# Patient Record
Sex: MALE | Race: WHITE | Employment: OTHER | URBAN - METROPOLITAN AREA
[De-identification: names, ages, dates, MRNs, and addresses within clinical notes are randomized per-mention and may not be internally consistent; named-entity substitution may affect disease eponyms.]

---

## 2017-11-28 ENCOUNTER — HOSPITAL ENCOUNTER (OUTPATIENT)
Dept: RADIATION THERAPY | Age: 72
Discharge: HOME | End: 2017-11-28

## 2019-08-06 PROBLEM — S83.249A ACUTE MEDIAL MENISCUS TEAR: Status: ACTIVE | Noted: 2019-08-06

## 2019-08-06 PROBLEM — S83.249A ACUTE MEDIAL MENISCUS TEAR: Status: RESOLVED | Noted: 2019-08-06 | Resolved: 2019-08-06

## 2019-08-12 ENCOUNTER — ANESTHESIA EVENT (OUTPATIENT)
Dept: ENDOSCOPY | Age: 74
End: 2019-08-12
Payer: MEDICARE

## 2019-08-13 ENCOUNTER — ANESTHESIA (OUTPATIENT)
Dept: ENDOSCOPY | Age: 74
End: 2019-08-13
Payer: MEDICARE

## 2019-08-13 NOTE — ANESTHESIA PREPROCEDURE EVALUATION
Relevant Problems   No relevant active problems       Anesthetic History   No history of anesthetic complications            Review of Systems / Medical History  Patient summary reviewed, nursing notes reviewed and pertinent labs reviewed    Pulmonary  Within defined limits                 Neuro/Psych   Within defined limits           Cardiovascular    Hypertension              Exercise tolerance: >4 METS     GI/Hepatic/Renal               Comments: Pancreatic mass Endo/Other        Obesity     Other Findings            Physical Exam    Airway  Mallampati: II  TM Distance: > 6 cm  Neck ROM: normal range of motion   Mouth opening: Normal     Cardiovascular  Regular rate and rhythm,  S1 and S2 normal,  no murmur, click, rub, or gallop             Dental    Dentition: Upper partial plate     Pulmonary  Breath sounds clear to auscultation               Abdominal  GI exam deferred       Other Findings            Anesthetic Plan    ASA: 2  Anesthesia type: general and total IV anesthesia          Induction: Intravenous  Anesthetic plan and risks discussed with: Patient

## 2019-08-20 ENCOUNTER — HOSPITAL ENCOUNTER (OUTPATIENT)
Age: 74
Setting detail: OUTPATIENT SURGERY
Discharge: HOME | End: 2019-08-20
Attending: INTERNAL MEDICINE | Admitting: INTERNAL MEDICINE
Payer: MEDICARE

## 2019-08-20 VITALS
DIASTOLIC BLOOD PRESSURE: 81 MMHG | TEMPERATURE: 97.6 F | OXYGEN SATURATION: 97 % | SYSTOLIC BLOOD PRESSURE: 140 MMHG | HEART RATE: 94 BPM | WEIGHT: 220.19 LBS | BODY MASS INDEX: 31.52 KG/M2 | RESPIRATION RATE: 10 BRPM | HEIGHT: 70 IN

## 2019-08-20 PROCEDURE — 88305 TISSUE EXAM BY PATHOLOGIST: CPT

## 2019-08-20 PROCEDURE — 88172 CYTP DX EVAL FNA 1ST EA SITE: CPT

## 2019-08-20 PROCEDURE — 76040000007: Performed by: INTERNAL MEDICINE

## 2019-08-20 PROCEDURE — 74011250636 HC RX REV CODE- 250/636: Performed by: NURSE ANESTHETIST, CERTIFIED REGISTERED

## 2019-08-20 PROCEDURE — 74011250636 HC RX REV CODE- 250/636: Performed by: INTERNAL MEDICINE

## 2019-08-20 PROCEDURE — 77030036673 HC NDL BIOP ENDOSC SHRKCOR PRELD COVD -E: Performed by: INTERNAL MEDICINE

## 2019-08-20 PROCEDURE — 74011000250 HC RX REV CODE- 250: Performed by: NURSE ANESTHETIST, CERTIFIED REGISTERED

## 2019-08-20 PROCEDURE — 88173 CYTOPATH EVAL FNA REPORT: CPT

## 2019-08-20 PROCEDURE — 77030019988 HC FCPS ENDOSC DISP BSC -B: Performed by: INTERNAL MEDICINE

## 2019-08-20 PROCEDURE — 77030019957 HC CUF BLN GASTSCP OCOA -B: Performed by: INTERNAL MEDICINE

## 2019-08-20 PROCEDURE — 77030003406 HC NDL ASPIR BIOP OCOA -C: Performed by: INTERNAL MEDICINE

## 2019-08-20 PROCEDURE — 76060000032 HC ANESTHESIA 0.5 TO 1 HR: Performed by: INTERNAL MEDICINE

## 2019-08-20 RX ORDER — ATROPINE SULFATE 0.1 MG/ML
0.5 INJECTION INTRAVENOUS
Status: DISCONTINUED | OUTPATIENT
Start: 2019-08-20 | End: 2019-08-20 | Stop reason: SDUPTHER

## 2019-08-20 RX ORDER — EPINEPHRINE 0.1 MG/ML
1 INJECTION INTRACARDIAC; INTRAVENOUS
Status: DISCONTINUED | OUTPATIENT
Start: 2019-08-20 | End: 2019-08-20 | Stop reason: SDUPTHER

## 2019-08-20 RX ORDER — PROPOFOL 10 MG/ML
INJECTION, EMULSION INTRAVENOUS
Status: DISCONTINUED | OUTPATIENT
Start: 2019-08-20 | End: 2019-08-20 | Stop reason: HOSPADM

## 2019-08-20 RX ORDER — NALOXONE HYDROCHLORIDE 0.4 MG/ML
0.4 INJECTION, SOLUTION INTRAMUSCULAR; INTRAVENOUS; SUBCUTANEOUS
Status: DISCONTINUED | OUTPATIENT
Start: 2019-08-20 | End: 2019-08-20 | Stop reason: SDUPTHER

## 2019-08-20 RX ORDER — LEVOFLOXACIN 5 MG/ML
500 INJECTION, SOLUTION INTRAVENOUS ONCE
Status: COMPLETED | OUTPATIENT
Start: 2019-08-20 | End: 2019-08-20

## 2019-08-20 RX ORDER — FLUMAZENIL 0.1 MG/ML
0.2 INJECTION INTRAVENOUS
Status: DISCONTINUED | OUTPATIENT
Start: 2019-08-20 | End: 2019-08-20 | Stop reason: HOSPADM

## 2019-08-20 RX ORDER — SODIUM CHLORIDE 0.9 % (FLUSH) 0.9 %
5-40 SYRINGE (ML) INJECTION EVERY 8 HOURS
Status: DISCONTINUED | OUTPATIENT
Start: 2019-08-20 | End: 2019-08-20 | Stop reason: SDUPTHER

## 2019-08-20 RX ORDER — SODIUM CHLORIDE 9 MG/ML
75 INJECTION, SOLUTION INTRAVENOUS CONTINUOUS
Status: DISCONTINUED | OUTPATIENT
Start: 2019-08-20 | End: 2019-08-20 | Stop reason: HOSPADM

## 2019-08-20 RX ORDER — SODIUM CHLORIDE 0.9 % (FLUSH) 0.9 %
5-40 SYRINGE (ML) INJECTION AS NEEDED
Status: DISCONTINUED | OUTPATIENT
Start: 2019-08-20 | End: 2019-08-20 | Stop reason: HOSPADM

## 2019-08-20 RX ORDER — MIDAZOLAM HYDROCHLORIDE 1 MG/ML
1-3 INJECTION, SOLUTION INTRAMUSCULAR; INTRAVENOUS
Status: DISCONTINUED | OUTPATIENT
Start: 2019-08-20 | End: 2019-08-20 | Stop reason: HOSPADM

## 2019-08-20 RX ORDER — EPINEPHRINE 0.1 MG/ML
1 INJECTION INTRACARDIAC; INTRAVENOUS
Status: DISCONTINUED | OUTPATIENT
Start: 2019-08-20 | End: 2019-08-20 | Stop reason: HOSPADM

## 2019-08-20 RX ORDER — ATROPINE SULFATE 0.1 MG/ML
0.5 INJECTION INTRAVENOUS
Status: DISCONTINUED | OUTPATIENT
Start: 2019-08-20 | End: 2019-08-20 | Stop reason: HOSPADM

## 2019-08-20 RX ORDER — SODIUM CHLORIDE 0.9 % (FLUSH) 0.9 %
5-40 SYRINGE (ML) INJECTION EVERY 8 HOURS
Status: DISCONTINUED | OUTPATIENT
Start: 2019-08-20 | End: 2019-08-20 | Stop reason: HOSPADM

## 2019-08-20 RX ORDER — SODIUM CHLORIDE 0.9 % (FLUSH) 0.9 %
5-40 SYRINGE (ML) INJECTION AS NEEDED
Status: DISCONTINUED | OUTPATIENT
Start: 2019-08-20 | End: 2019-08-20 | Stop reason: SDUPTHER

## 2019-08-20 RX ORDER — FLUMAZENIL 0.1 MG/ML
0.2 INJECTION INTRAVENOUS
Status: DISCONTINUED | OUTPATIENT
Start: 2019-08-20 | End: 2019-08-20 | Stop reason: SDUPTHER

## 2019-08-20 RX ORDER — LIDOCAINE HYDROCHLORIDE 20 MG/ML
INJECTION, SOLUTION EPIDURAL; INFILTRATION; INTRACAUDAL; PERINEURAL AS NEEDED
Status: DISCONTINUED | OUTPATIENT
Start: 2019-08-20 | End: 2019-08-20 | Stop reason: HOSPADM

## 2019-08-20 RX ORDER — DEXTROMETHORPHAN/PSEUDOEPHED 2.5-7.5/.8
1.2 DROPS ORAL
Status: DISCONTINUED | OUTPATIENT
Start: 2019-08-20 | End: 2019-08-20 | Stop reason: SDUPTHER

## 2019-08-20 RX ORDER — DEXTROMETHORPHAN/PSEUDOEPHED 2.5-7.5/.8
1.2 DROPS ORAL
Status: DISCONTINUED | OUTPATIENT
Start: 2019-08-20 | End: 2019-08-20 | Stop reason: HOSPADM

## 2019-08-20 RX ORDER — NALOXONE HYDROCHLORIDE 0.4 MG/ML
0.4 INJECTION, SOLUTION INTRAMUSCULAR; INTRAVENOUS; SUBCUTANEOUS
Status: DISCONTINUED | OUTPATIENT
Start: 2019-08-20 | End: 2019-08-20 | Stop reason: HOSPADM

## 2019-08-20 RX ORDER — PROPOFOL 10 MG/ML
INJECTION, EMULSION INTRAVENOUS AS NEEDED
Status: DISCONTINUED | OUTPATIENT
Start: 2019-08-20 | End: 2019-08-20 | Stop reason: HOSPADM

## 2019-08-20 RX ORDER — GLYCOPYRROLATE 0.2 MG/ML
INJECTION INTRAMUSCULAR; INTRAVENOUS AS NEEDED
Status: DISCONTINUED | OUTPATIENT
Start: 2019-08-20 | End: 2019-08-20 | Stop reason: HOSPADM

## 2019-08-20 RX ADMIN — PROPOFOL 30 MG: 10 INJECTION, EMULSION INTRAVENOUS at 12:38

## 2019-08-20 RX ADMIN — PROPOFOL 100 MCG/KG/MIN: 10 INJECTION, EMULSION INTRAVENOUS at 12:40

## 2019-08-20 RX ADMIN — GLYCOPYRROLATE 0.2 MG: 0.2 INJECTION, SOLUTION INTRAMUSCULAR; INTRAVENOUS at 12:35

## 2019-08-20 RX ADMIN — LEVOFLOXACIN 500 MG: 5 INJECTION, SOLUTION INTRAVENOUS at 13:00

## 2019-08-20 RX ADMIN — PROPOFOL 70 MG: 10 INJECTION, EMULSION INTRAVENOUS at 12:35

## 2019-08-20 RX ADMIN — LIDOCAINE HYDROCHLORIDE 40 MG: 20 INJECTION, SOLUTION EPIDURAL; INFILTRATION; INTRACAUDAL; PERINEURAL at 12:35

## 2019-08-20 RX ADMIN — SODIUM CHLORIDE: 900 INJECTION, SOLUTION INTRAVENOUS at 12:30

## 2019-08-20 NOTE — ROUTINE PROCESS
Federico Lott  1945  [id number]    Situation:  Verbal report received from: Tenisha FERRARI  Procedure: Procedure(s):  ENDOSCOPIC ULTRASOUND (EUS)  ESOPHAGOGASTRODUODENOSCOPY (EGD)  ESOPHAGOGASTRODUODENAL (EGD) BIOPSY  FINE NEEDLE ASPIRATION    Background:    Preoperative diagnosis: MASS OF PANCREAS  Postoperative diagnosis: EGD: Gastritis  EUS: MASS OF PANCREAS    :  Dr. Mica Dejesus  Assistant(s): Endoscopy Technician-1: Josephine Crum  Endoscopy RN-1: Kasandra Chaudhary RN    Specimens:   ID Type Source Tests Collected by Time Destination   1 : Bx Preservative Gastric  Irene Ling MD 8/20/2019 1258 Pathology     H. Pylori  no    Assessment:  Intra-procedure medications       Anesthesia gave intra-procedure sedation and medications, see anesthesia flow sheet yes    Intravenous fluids: NS@ KVO     Vital signs stable       Abdominal assessment: round and soft       Recommendation:  Discharge patient per MD order.     Family or Friend  Ronald Andrew Wife  Permission to share finding with family or friend yes

## 2019-08-20 NOTE — ANESTHESIA POSTPROCEDURE EVALUATION
Procedure(s):  ENDOSCOPIC ULTRASOUND (EUS)  ESOPHAGOGASTRODUODENOSCOPY (EGD)  ESOPHAGOGASTRODUODENAL (EGD) BIOPSY  FINE NEEDLE ASPIRATION. general, total IV anesthesia    Anesthesia Post Evaluation        Patient location during evaluation: PACU  Note status: Adequate. Level of consciousness: responsive to verbal stimuli and sleepy but conscious  Pain management: satisfactory to patient  Airway patency: patent  Anesthetic complications: no  Cardiovascular status: acceptable  Respiratory status: acceptable  Hydration status: acceptable  Comments: +Post-Anesthesia Evaluation and Assessment    Patient: Gavin Schmidt MRN: [id number]  SSN: [id number]   YOB: 1945  Age: 75 y.o. Sex: male      Cardiovascular Function/Vital Signs    /80   Pulse (!) 102   Temp 36.5 °C (97.7 °F)   Resp 23   Ht 5' 10\" (1.778 m)   Wt 99.9 kg (220 lb 3 oz)   SpO2 92%   BMI 31.59 kg/m²     Patient is status post Procedure(s):  ENDOSCOPIC ULTRASOUND (EUS)  ESOPHAGOGASTRODUODENOSCOPY (EGD)  ESOPHAGOGASTRODUODENAL (EGD) BIOPSY  FINE NEEDLE ASPIRATION. Nausea/Vomiting: Controlled. Postoperative hydration reviewed and adequate. Pain:  Pain Scale 1: Numeric (0 - 10) (08/20/19 1125)  Pain Intensity 1: 0 (08/20/19 1125)   Managed. Neurological Status: At baseline. Mental Status and Level of Consciousness: Arousable. Pulmonary Status:   O2 Device: Room air (08/20/19 1315)   Adequate oxygenation and airway patent. Complications related to anesthesia: None    Post-anesthesia assessment completed. No concerns.     Signed By: Brandon Haney MD    8/20/2019  Post anesthesia nausea and vomiting:  controlled      Vitals Value Taken Time   /80 8/20/2019  1:15 PM   Temp     Pulse 102 8/20/2019  1:15 PM   Resp 23 8/20/2019  1:15 PM   SpO2 92 % 8/20/2019  1:15 PM

## 2019-08-20 NOTE — H&P
Gastroenterology Outpatient History and Physical    Patient: Kurt Garnica    Physician: Lyle Perez MD    Chief Complaint: Pancreatic mass  History of Present Illness: No complaints    History:  Past Medical History:   Diagnosis Date    Hypertension     History reviewed. No pertinent surgical history. Social History     Socioeconomic History    Marital status:      Spouse name: Not on file    Number of children: Not on file    Years of education: Not on file    Highest education level: Not on file   Tobacco Use    Smoking status: Never Smoker    Smokeless tobacco: Never Used   Substance and Sexual Activity    Alcohol use: Yes     Alcohol/week: 1.0 standard drinks     Types: 1 Cans of beer per week    History reviewed. No pertinent family history. Patient Active Problem List   Diagnosis Code   (none) - all problems resolved or deleted       Allergies: No Known Allergies  Medications:   Prior to Admission medications    Medication Sig Start Date End Date Taking? Authorizing Provider   triamcinolone acetonide (KENALOG) 0.1 % dental paste  7/10/19  Yes Provider, Historical   amLODIPine (NORVASC) 5 mg tablet Take 5 mg by mouth daily. Yes Provider, Historical   losartan (COZAAR) 50 mg tablet Take  by mouth daily. Yes Provider, Historical   sildenafil, antihypertensive, (REVATIO) 20 mg tablet Take 20 mg by mouth three (3) times daily. Yes Provider, Historical   lansoprazole (PREVACID) 30 mg capsule Take  by mouth Daily (before breakfast). Yes Provider, Historical     Physical Exam:   Vital Signs: Blood pressure 141/90, pulse 80, temperature 97.7 °F (36.5 °C), resp. rate 27, height 5' 10\" (1.778 m), weight 99.9 kg (220 lb 3 oz), SpO2 96 %.   General: well developed, well nourished   HEENT: unremarkable   Heart: regular rhythm no mumur    Lungs: clear   Abdominal:  benign   Neurological: unremarkable   Extremities: no edema     Findings/Diagnosis: Pancreatic mass  Plan of Care/Planned Procedure: EUS with conscious/deep sedation    Signed:  Preston Delong MD 8/20/2019

## 2019-08-20 NOTE — PROCEDURES
NAME:  Tonio Quinn   :   1945   MRN:   [id number]     [facility]GUY[facility]N [facility]    Date/Time:  2019   Procedure Type: EGD/EUS    Indications: Pancreatic mass    Pre-operative Diagnosis: see indication above    Post-operative Diagnosis:  See findings below    : Danuta Cummings MD    Referring Provider: --Spencer Agarwal MD    Procedure Details:    Exam:  Airway: clear, no airway problems anticipated  Heart: RRR, without gallops or rubs  Lungs: clear bilaterally without wheezes, crackles, or rhonchi  Abdomen: soft, nontender, nondistended, bowel sounds present  Mental Status: awake, alert and oriented to person, place and time     Anethesia/Sedation:  MAC anesthesia Propofol      Procedure Details   After infom consent was obtained for the procedure, with all risks and benefits of procedure explained the patient was taken to the endoscopy suite and placed in the left lateral decubitus position. Following sequential administration of sedation as per above, the linear echoendoscope was inserted into the mouth and advanced under direct vision to second portion of the duodenum. A careful inspection was made as the gastroscope was withdrawn, including a retroflexed view of the proximal stomach; findings and interventions are described below. Findings:     Endoscopic:  1. Normal proximal, mid, and distal esophagus  2. Mild, segmental, non-erosive antral gastropathy. Biopsied  3. Stomach otherwise normal, including retroflexion  4. Normal duodenal bulb and 2nd portion of the duodenum    Ultrasound:   Pancreas:   A 22 mm by 17 mm oval mass with cystic and solid components was seen in the distal pancreatic body. There was no invasion of  the splenic artery or vein      Specimen Removed:  1. Gastric    Complications: None. EBL:  None. Interventions: Fine needle biopsy was performed of the pancreas  using a 22 gauge and a 25 gauge needle with 4 of passes. Recommendations:   1.  Follow up pathology and cytology    Walt Marin MD

## 2019-08-20 NOTE — PROGRESS NOTES
Anesthesia reports 410mg Propofol, 40mg Lidocaine and 500mL NS given during procedure. Received report from anesthesia staff on vital signs and status of patient.

## 2019-08-20 NOTE — DISCHARGE INSTRUCTIONS
Leticia Duong  [id number]  1945    EUS DISCHARGE INSTRUCTIONS  Discomfort:  Sore throat- throat lozenges or warm salt water gargle  redness at IV site- apply warm compress to area; if redness or soreness persist- contact your physician  Gaseous discomfort- walking, belching will help relieve any discomfort  You may not operate a vehicle for 12 hours  You may not engage in an occupation involving machinery or appliances for rest of today  You may not drink alcoholic beverages for at least 12 hours  Avoid making any critical decisions for at least 24 hour  DIET  You may resume your regular diet - however -  remember your colon is empty and a heavy meal will produce gas. Avoid these foods:  vegetables, fried / greasy foods, carbonated drinks    MEDICATIONS:  Per Medication Reconciliation      ACTIVITY  You may resume your normal daily activities until tomorrow AM;  Spend the remainder of the day resting -  avoid any strenuous activity. CALL M.D. ANY SIGN OF   Increasing pain, nausea, vomiting  Abdominal distension (swelling)  New increased bleeding (oral or rectal)  Fever (chills)  Pain in chest area  Bloody discharge from nose or mouth  Shortness of breath    You may not take any Advil, Aspirin, Ibuprofen, Motrin, Aleve, or Goodys for 10 days, ONLY  Tylenol as needed for pain. IMPRESSION:  Endoscopic:  1. Normal proximal, mid, and distal esophagus  2. Mild, segmental, non-erosive antral gastropathy. Biopsied  3. Stomach otherwise normal, including retroflexion  4. Normal duodenal bulb and 2nd portion of the duodenum    Ultrasound:   Pancreas:   A 22 mm by 17 mm oval mass with cystic and solid components was seen in the distal pancreatic body. There was no invasion of  the splenic artery or vein      Interventions: Fine needle biopsy was performed of the pancreas  using a 22 gauge and a 25 gauge needle with 4 of passes. Recommendations:   1.  Follow up pathology and cytology    Follow-up Instructions:   Call Dr. Taryn Cohen for the results of procedure / biopsy in 7-10 days   Telephone # [phone]    Paul Ocasio MD

## 2020-10-13 ENCOUNTER — HOSPITAL ENCOUNTER (OUTPATIENT)
Dept: PREADMISSION TESTING | Age: 75
Discharge: HOME | End: 2020-10-13
Payer: MEDICARE

## 2020-10-13 ENCOUNTER — TRANSCRIBE ORDER (OUTPATIENT)
Dept: REGISTRATION | Age: 75
End: 2020-10-13

## 2020-10-13 VITALS
BODY MASS INDEX: 31.97 KG/M2 | RESPIRATION RATE: 17 BRPM | WEIGHT: 223.33 LBS | TEMPERATURE: 97.8 F | HEIGHT: 70 IN | SYSTOLIC BLOOD PRESSURE: 134 MMHG | DIASTOLIC BLOOD PRESSURE: 78 MMHG | HEART RATE: 72 BPM

## 2020-10-13 DIAGNOSIS — Z01.812 PRE-PROCEDURE LAB EXAM: Primary | ICD-10-CM

## 2020-10-13 LAB
ABO + RH BLD: NORMAL
ANION GAP SERPL CALC-SCNC: 6 MMOL/L (ref 5–15)
APPEARANCE UR: CLEAR
BACTERIA URNS QL MICRO: NEGATIVE /HPF
BILIRUB UR QL: NEGATIVE
BLOOD GROUP ANTIBODIES SERPL: NORMAL
BUN SERPL-MCNC: 10 MG/DL (ref 6–20)
BUN/CREAT SERPL: 14 (ref 12–20)
CALCIUM SERPL-MCNC: 8.9 MG/DL (ref 8.5–10.1)
CHLORIDE SERPL-SCNC: 108 MMOL/L (ref 97–108)
CO2 SERPL-SCNC: 26 MMOL/L (ref 21–32)
COLOR UR: NORMAL
CREAT SERPL-MCNC: 0.7 MG/DL (ref 0.7–1.3)
EPITH CASTS URNS QL MICRO: NORMAL /LPF
ERYTHROCYTE [DISTWIDTH] IN BLOOD BY AUTOMATED COUNT: 12.8 % (ref 11.5–14.5)
EST. AVERAGE GLUCOSE BLD GHB EST-MCNC: 117 MG/DL
GLUCOSE SERPL-MCNC: 106 MG/DL (ref 65–100)
GLUCOSE UR STRIP.AUTO-MCNC: NEGATIVE MG/DL
HBA1C MFR BLD: 5.7 % (ref 4–5.6)
HCT VFR BLD AUTO: 44 % (ref 36.6–50.3)
HGB BLD-MCNC: 14.7 G/DL (ref 12.1–17)
HGB UR QL STRIP: NEGATIVE
HYALINE CASTS URNS QL MICRO: NORMAL /LPF (ref 0–5)
INR PPP: 1 (ref 0.9–1.1)
KETONES UR QL STRIP.AUTO: NEGATIVE MG/DL
LEUKOCYTE ESTERASE UR QL STRIP.AUTO: NEGATIVE
MCH RBC QN AUTO: 31.9 PG (ref 26–34)
MCHC RBC AUTO-ENTMCNC: 33.4 G/DL (ref 30–36.5)
MCV RBC AUTO: 95.4 FL (ref 80–99)
NITRITE UR QL STRIP.AUTO: NEGATIVE
NRBC # BLD: 0 K/UL (ref 0–0.01)
NRBC BLD-RTO: 0 PER 100 WBC
PH UR STRIP: 7 [PH] (ref 5–8)
PLATELET # BLD AUTO: 233 K/UL (ref 150–400)
PMV BLD AUTO: 10.5 FL (ref 8.9–12.9)
POTASSIUM SERPL-SCNC: 4.1 MMOL/L (ref 3.5–5.1)
PROT UR STRIP-MCNC: NEGATIVE MG/DL
PROTHROMBIN TIME: 10.7 SEC (ref 9–11.1)
RBC # BLD AUTO: 4.61 M/UL (ref 4.1–5.7)
RBC #/AREA URNS HPF: NORMAL /HPF (ref 0–5)
SODIUM SERPL-SCNC: 140 MMOL/L (ref 136–145)
SP GR UR REFRACTOMETRY: 1.01 (ref 1–1.03)
SPECIMEN EXP DATE BLD: NORMAL
UA: UC IF INDICATED,UAUC: NORMAL
UROBILINOGEN UR QL STRIP.AUTO: 0.2 EU/DL (ref 0.2–1)
WBC # BLD AUTO: 6.3 K/UL (ref 4.1–11.1)
WBC URNS QL MICRO: NORMAL /HPF (ref 0–4)

## 2020-10-13 PROCEDURE — 83036 HEMOGLOBIN GLYCOSYLATED A1C: CPT

## 2020-10-13 PROCEDURE — 86900 BLOOD TYPING SEROLOGIC ABO: CPT

## 2020-10-13 PROCEDURE — 81001 URINALYSIS AUTO W/SCOPE: CPT

## 2020-10-13 PROCEDURE — 85027 COMPLETE CBC AUTOMATED: CPT

## 2020-10-13 PROCEDURE — 36415 COLL VENOUS BLD VENIPUNCTURE: CPT

## 2020-10-13 PROCEDURE — 93005 ELECTROCARDIOGRAM TRACING: CPT

## 2020-10-13 PROCEDURE — 85610 PROTHROMBIN TIME: CPT

## 2020-10-13 PROCEDURE — 80048 BASIC METABOLIC PNL TOTAL CA: CPT

## 2020-10-13 RX ORDER — IBUPROFEN 200 MG
600 TABLET ORAL
COMMUNITY
End: 2020-10-23

## 2020-10-13 NOTE — PERIOP NOTES
PAT Nurse Practitioner   Pre-Operative Chart Review/Assessment:-ORTHOPEDIC                Patient Name:  Nuvia Lomas                                                           Age:   75 y.o.    :  1945     Today's Date:  10/14/2020     Date of PAT:   10/13/2020      Date of Surgery:    10/22/2020      Procedure(s):  Left Total vs. Medial Unicondylar Knee Arthroplasty     Surgeon:   Dr. Jerry Menchaca                       PLAN:      1)  Medical Clearance:  Dr. Tiffany Mancera      2)  Cardiac Clearance:  Not requested. 3)  Diabetic Treatment Consult:  Not indicated. A1c-5.7      4)  Sleep Apnea evaluation:   CHAPARRITA score of 4. Pt reports occasional loud snoring that can be heard through a closed door. Pt denies daytime fatigue and witnessed pauses in breathing. Referral sent to PCP for F/U and recommendations. 5) Treatment for MRSA/Staph Aureus:  +MRSA. Tx w/ mupirocin. 6) Additional Concerns:  Former smoker, HTN, GERD, [place]              Vital Signs:         Vitals:    10/13/20 0929   BP: 134/78   Pulse: 72   Resp: 17   Temp: 97.8 °F (36.6 °C)   Weight: 101.3 kg (223 lb 5.2 oz)   Height: 5' 10\" (1.778 m)            ____________________________________________  PAST MEDICAL HISTORY  Past Medical History:   Diagnosis Date    Arthritis     Cancer ([address] 10/2018    PROSTATE    Chronic pain     GERD (gastroesophageal reflux disease)     Hypertension     Kidney stones       ____________________________________________  PAST SURGICAL HISTORY  Past Surgical History:   Procedure Laterality Date    HX KNEE ARTHROSCOPY Left     HX PROSTATE SURGERY      PROSTATE SEEDS IMPLANTED       ____________________________________________  HOME MEDICATIONS  Current Outpatient Medications   Medication Sig    mupirocin (BACTROBAN) 2 % ointment by Both Nostrils route two (2) times a day for 7 days.  mupirocin (BACTROBAN) 2 % ointment by Both Nostrils route two (2) times a day.  Indications: methicillin-resistant Staphylococcus aureus bacteria in nose    ibuprofen (AdviL) 200 mg tablet Take 600 mg by mouth every twelve (12) hours as needed for Pain.  triamcinolone acetonide (KENALOG) 0.1 % dental paste     amLODIPine (NORVASC) 5 mg tablet Take 5 mg by mouth daily.  losartan (COZAAR) 50 mg tablet Take  by mouth daily.  sildenafil, antihypertensive, (REVATIO) 20 mg tablet Take 20 mg by mouth three (3) times daily.  lansoprazole (PREVACID) 30 mg capsule Take  by mouth Daily (before breakfast). No current facility-administered medications for this encounter.       ____________________________________________  ALLERGIES  No Known Allergies   ____________________________________________  SOCIAL HISTORY  Social History     Tobacco Use    Smoking status: Former Smoker     Packs/day: 0.25     Last attempt to quit: 1970     Years since quittin.8    Smokeless tobacco: Never Used   Substance Use Topics    Alcohol use:  Yes     Alcohol/week: 2.0 standard drinks     Types: 2 Cans of beer per week      ____________________________________________        Labs:     Hospital Outpatient Visit on 10/13/2020   Component Date Value Ref Range Status    Sodium 10/13/2020 140  136 - 145 mmol/L Final    Potassium 10/13/2020 4.1  3.5 - 5.1 mmol/L Final    Chloride 10/13/2020 108  97 - 108 mmol/L Final    CO2 10/13/2020 26  21 - 32 mmol/L Final    Anion gap 10/13/2020 6  5 - 15 mmol/L Final    Glucose 10/13/2020 106* 65 - 100 mg/dL Final    BUN 10/13/2020 10  6 - 20 MG/DL Final    Creatinine 10/13/2020 0.70  0.70 - 1.30 MG/DL Final    BUN/Creatinine ratio 10/13/2020 14  12 - 20   Final    GFR est AA 10/13/2020 >60  >60 ml/min/1.73m2 Final    GFR est non-AA 10/13/2020 >60  >60 ml/min/1.73m2 Final    Estimated GFR is calculated using the IDMS-traceable Modification of Diet in Renal Disease (MDRD) Study equation, reported for both  Americans (GFRAA) and non- Americans (GFRNA), and normalized to 1.73m2 body surface area. The physician must decide which value applies to the patient.  Calcium 10/13/2020 8.9  8.5 - 10.1 MG/DL Final    WBC 10/13/2020 6.3  4.1 - 11.1 K/uL Final    RBC 10/13/2020 4.61  4.10 - 5.70 M/uL Final    HGB 10/13/2020 14.7  12.1 - 17.0 g/dL Final    HCT 10/13/2020 44.0  36.6 - 50.3 % Final    MCV 10/13/2020 95.4  80.0 - 99.0 FL Final    MCH 10/13/2020 31.9  26.0 - 34.0 PG Final    MCHC 10/13/2020 33.4  30.0 - 36.5 g/dL Final    RDW 10/13/2020 12.8  11.5 - 14.5 % Final    PLATELET 34/09/4189 513  150 - 400 K/uL Final    MPV 10/13/2020 10.5  8.9 - 12.9 FL Final    NRBC 10/13/2020 0.0  0  WBC Final    ABSOLUTE NRBC 10/13/2020 0.00  0.00 - 0.01 K/uL Final    Crossmatch Expiration 10/13/2020 10/25/2020   Final    ABO/Rh(D) 10/13/2020 O POSITIVE   Final    Antibody screen 10/13/2020 NEG   Final    INR 10/13/2020 1.0  0.9 - 1.1   Final    A single therapeutic range for Vit K antagonists may not be optimal for all indications - see June, 2008 issue of Chest, American College of Chest Physicians Evidence-Based Clinical Practice Guidelines, 8th Edition.     Prothrombin time 10/13/2020 10.7  9.0 - 11.1 sec Final    Color 10/13/2020 YELLOW/STRAW    Final    Color Reference Range: Straw, Yellow or Dark Yellow    Appearance 10/13/2020 CLEAR  CLEAR   Final    Specific gravity 10/13/2020 1.010  1.003 - 1.030   Final    pH (UA) 10/13/2020 7.0  5.0 - 8.0   Final    Protein 10/13/2020 Negative  NEG mg/dL Final    Glucose 10/13/2020 Negative  NEG mg/dL Final    Ketone 10/13/2020 Negative  NEG mg/dL Final    Bilirubin 10/13/2020 Negative  NEG   Final    Blood 10/13/2020 Negative  NEG   Final    Urobilinogen 10/13/2020 0.2  0.2 - 1.0 EU/dL Final    Nitrites 10/13/2020 Negative  NEG   Final    Leukocyte Esterase 10/13/2020 Negative  NEG   Final    UA:UC IF INDICATED 10/13/2020 CULTURE NOT INDICATED BY UA RESULT    Final    WBC 10/13/2020 0-4  0 - 4 /hpf Final    RBC 10/13/2020 0-5  0 - 5 /hpf Final    Epithelial cells 10/13/2020 FEW  FEW /lpf Final    Epithelial cell category consists of squamous cells and /or transitional urothelial cells. Renal tubular cells, if present, are separately identified as such.  Bacteria 10/13/2020 Negative  NEG /hpf Final    Hyaline cast 10/13/2020 0-2  0 - 5 /lpf Final    Ventricular Rate 10/13/2020 70  BPM Final    Atrial Rate 10/13/2020 70  BPM Final    P-R Interval 10/13/2020 142  ms Final    QRS Duration 10/13/2020 82  ms Final    Q-T Interval 10/13/2020 396  ms Final    QTC Calculation (Bezet) 10/13/2020 427  ms Final    Calculated P Axis 10/13/2020 10  degrees Final    Calculated R Axis 10/13/2020 -18  degrees Final    Calculated T Axis 10/13/2020 17  degrees Final    Diagnosis 10/13/2020    Final                    Value:Normal sinus rhythm  Normal ECG  No previous ECGs available  Confirmed by Alley Easton M.D., Doc Denson ([id number]) on 10/14/2020 10:16:09 AM      Hemoglobin A1c 10/13/2020 5.7* 4.0 - 5.6 % Final    Comment: NEW METHOD  PLEASE NOTE NEW REFERENCE RANGE  (NOTE)  HbA1C Interpretive Ranges  <5.7              Normal  5.7 - 6.4         Consider Prediabetes  >6.5              Consider Diabetes      Est. average glucose 10/13/2020 117  mg/dL Final    Special Requests: 10/13/2020 NO SPECIAL REQUESTS    Final    Culture result: 10/13/2020 MRSA PRESENT*   Final       Skin:     Denies open wounds, cuts, sores, rashes or other areas of concern in PAT assessment.           Allie Martinez NP

## 2020-10-13 NOTE — PERIOP NOTES
DO NOT EAT ANYTHING AFTER MIDNIGHT, except as instructed THE NIGHT BEFORE SURGERY. PT GIVEN OPPORTUNITY TO ASK ADDITIONAL QUESTIONS. PRE-OPERATIVE INSTRUCTIONS REVIEWED WITH PATIENT. PATIENT GIVEN 2-PACK OF CHG SOAP. INSTRUCTIONS (REVIEWED) ON USE OF CHG SOAP. PATIENT GIVEN [facility]SI INFECTION FAQ SHEET. MRSA / MSSA TREATMENT INSTRUCTION SHEET GIVEN WITH AN EXPLANATION TO PATIENT THAT THEY WILL BE NOTIFIED IF TREATMENT INSTRUCTIONS NEED TO BE INITIATED. PATIENT WAS GIVEN THE OPPORTUNITY TO ASK QUESTIONS ON THE INFORMATION PROVIDED. [facility] APPOINTMENTS REVIEWED AND GIVEN TO PATIENT, INCLUDING PHONE NUMBER TO REGISTRATION. COVID-19 INSTRUCTION SHEET ALSO REVIEWED AND GIVEN TO PATIENT.

## 2020-10-14 LAB
ATRIAL RATE: 70 BPM
BACTERIA SPEC CULT: ABNORMAL
BACTERIA SPEC CULT: ABNORMAL
CALCULATED P AXIS, ECG09: 10 DEGREES
CALCULATED R AXIS, ECG10: -18 DEGREES
CALCULATED T AXIS, ECG11: 17 DEGREES
DIAGNOSIS, 93000: NORMAL
P-R INTERVAL, ECG05: 142 MS
Q-T INTERVAL, ECG07: 396 MS
QRS DURATION, ECG06: 82 MS
QTC CALCULATION (BEZET), ECG08: 427 MS
SERVICE CMNT-IMP: ABNORMAL
VENTRICULAR RATE, ECG03: 70 BPM

## 2020-10-14 RX ORDER — MUPIROCIN 20 MG/G
OINTMENT TOPICAL 2 TIMES DAILY
Qty: 22 G | Refills: 0 | Status: SHIPPED | OUTPATIENT
Start: 2020-10-14 | End: 2020-10-23

## 2020-10-14 RX ORDER — VANCOMYCIN/0.9 % SOD CHLORIDE 1.5G/250ML
1500 PLASTIC BAG, INJECTION (ML) INTRAVENOUS ONCE
Status: CANCELLED | OUTPATIENT
Start: 2020-10-22 | End: 2020-10-22

## 2020-10-14 RX ORDER — MUPIROCIN 20 MG/G
OINTMENT TOPICAL 2 TIMES DAILY
COMMUNITY
Start: 2020-10-14 | End: 2020-10-23

## 2020-10-14 NOTE — PERIOP NOTES
PC to pt regarding positive nasal cx (MRSA) and need to start Mupirocin ointment BID x 7 days to B nostrils starting today and bathe with CHG soap for 7 days prior to surgery. Pt verbalized understanding of instructions and will start today as recommended. Allergies and pharmacy of choice reviewed. Rx escribed to pt's pharmacy of choice. PTA medlist updated. Surgeon and PCP notified of positive culture and treatment. Order place for Vanc 1.5g IV x 1 for pre-op DOS.     Myah Sarah NP

## 2020-10-18 ENCOUNTER — HOSPITAL ENCOUNTER (OUTPATIENT)
Dept: PREADMISSION TESTING | Age: 75
Discharge: HOME | End: 2020-10-18
Payer: MEDICARE

## 2020-10-18 DIAGNOSIS — Z01.812 PRE-PROCEDURE LAB EXAM: ICD-10-CM

## 2020-10-18 PROCEDURE — 87635 SARS-COV-2 COVID-19 AMP PRB: CPT

## 2020-10-19 LAB — SARS-COV-2, COV2NT: NOT DETECTED

## 2020-10-20 RX ORDER — CEFAZOLIN SODIUM/WATER 2 G/20 ML
2 SYRINGE (ML) INTRAVENOUS ONCE
Status: CANCELLED | OUTPATIENT
Start: 2020-10-22 | End: 2020-10-22

## 2020-10-20 RX ORDER — CELECOXIB 200 MG/1
200 CAPSULE ORAL ONCE
Status: CANCELLED | OUTPATIENT
Start: 2020-10-20 | End: 2020-10-20

## 2020-10-20 RX ORDER — VANCOMYCIN/0.9 % SOD CHLORIDE 1.5G/250ML
1500 PLASTIC BAG, INJECTION (ML) INTRAVENOUS
Status: CANCELLED | OUTPATIENT
Start: 2020-10-22 | End: 2020-10-23

## 2020-10-20 RX ORDER — PREGABALIN 75 MG/1
75 CAPSULE ORAL ONCE
Status: CANCELLED | OUTPATIENT
Start: 2020-10-20 | End: 2020-10-20

## 2020-10-20 RX ORDER — ACETAMINOPHEN 500 MG
1000 TABLET ORAL ONCE
Status: CANCELLED | OUTPATIENT
Start: 2020-10-20 | End: 2020-10-20

## 2020-10-21 ENCOUNTER — ANESTHESIA EVENT (OUTPATIENT)
Dept: SURGERY | Age: 75
End: 2020-10-21
Payer: MEDICARE

## 2020-10-22 ENCOUNTER — HOSPITAL ENCOUNTER (OUTPATIENT)
Age: 75
Setting detail: OBSERVATION
Discharge: HOME HEALTH | End: 2020-10-23
Attending: ORTHOPAEDIC SURGERY | Admitting: ORTHOPAEDIC SURGERY
Payer: MEDICARE

## 2020-10-22 ENCOUNTER — ANESTHESIA (OUTPATIENT)
Dept: SURGERY | Age: 75
End: 2020-10-22
Payer: MEDICARE

## 2020-10-22 DIAGNOSIS — M17.12 PRIMARY LOCALIZED OSTEOARTHRITIS OF LEFT KNEE: Primary | ICD-10-CM

## 2020-10-22 LAB
GLUCOSE BLD STRIP.AUTO-MCNC: 116 MG/DL (ref 65–100)
SERVICE CMNT-IMP: ABNORMAL

## 2020-10-22 PROCEDURE — 77030041397 HC DRSG PRIMASEAL AG MDII -B: Performed by: ORTHOPAEDIC SURGERY

## 2020-10-22 PROCEDURE — 97161 PT EVAL LOW COMPLEX 20 MIN: CPT | Performed by: PHYSICAL THERAPIST

## 2020-10-22 PROCEDURE — 74011000250 HC RX REV CODE- 250: Performed by: PHYSICIAN ASSISTANT

## 2020-10-22 PROCEDURE — 74011250636 HC RX REV CODE- 250/636: Performed by: ANESTHESIOLOGY

## 2020-10-22 PROCEDURE — 77030000032 HC CUF TRNQT ZIMM -B: Performed by: ORTHOPAEDIC SURGERY

## 2020-10-22 PROCEDURE — 77030002933 HC SUT MCRYL J&J -A: Performed by: ORTHOPAEDIC SURGERY

## 2020-10-22 PROCEDURE — 76210000006 HC OR PH I REC 0.5 TO 1 HR: Performed by: ORTHOPAEDIC SURGERY

## 2020-10-22 PROCEDURE — 74011250636 HC RX REV CODE- 250/636: Performed by: ORTHOPAEDIC SURGERY

## 2020-10-22 PROCEDURE — 74011250636 HC RX REV CODE- 250/636: Performed by: NURSE ANESTHETIST, CERTIFIED REGISTERED

## 2020-10-22 PROCEDURE — 76010000171 HC OR TIME 2 TO 2.5 HR INTENSV-TIER 1: Performed by: ORTHOPAEDIC SURGERY

## 2020-10-22 PROCEDURE — 77030006807 HC BLD SAW RECIP KMET -B: Performed by: ORTHOPAEDIC SURGERY

## 2020-10-22 PROCEDURE — 77030018836 HC SOL IRR NACL ICUM -A: Performed by: ORTHOPAEDIC SURGERY

## 2020-10-22 PROCEDURE — 77030012935 HC DRSG AQUACEL BMS -B: Performed by: ORTHOPAEDIC SURGERY

## 2020-10-22 PROCEDURE — 77030014077 HC TOWER MX CEM J&J -C: Performed by: ORTHOPAEDIC SURGERY

## 2020-10-22 PROCEDURE — 77030018673: Performed by: ORTHOPAEDIC SURGERY

## 2020-10-22 PROCEDURE — 74011000250 HC RX REV CODE- 250: Performed by: NURSE ANESTHETIST, CERTIFIED REGISTERED

## 2020-10-22 PROCEDURE — 77030006835 HC BLD SAW SAG STRY -B: Performed by: ORTHOPAEDIC SURGERY

## 2020-10-22 PROCEDURE — 74011000258 HC RX REV CODE- 258: Performed by: NURSE ANESTHETIST, CERTIFIED REGISTERED

## 2020-10-22 PROCEDURE — 82962 GLUCOSE BLOOD TEST: CPT

## 2020-10-22 PROCEDURE — 76060000035 HC ANESTHESIA 2 TO 2.5 HR: Performed by: ORTHOPAEDIC SURGERY

## 2020-10-22 PROCEDURE — 74011250636 HC RX REV CODE- 250/636: Performed by: PHYSICIAN ASSISTANT

## 2020-10-22 PROCEDURE — C1776 JOINT DEVICE (IMPLANTABLE): HCPCS | Performed by: ORTHOPAEDIC SURGERY

## 2020-10-22 PROCEDURE — 77030031139 HC SUT VCRL2 J&J -A: Performed by: ORTHOPAEDIC SURGERY

## 2020-10-22 PROCEDURE — 77030039497 HC CST PAD STERILE CHCS -A: Performed by: ORTHOPAEDIC SURGERY

## 2020-10-22 PROCEDURE — C1713 ANCHOR/SCREW BN/BN,TIS/BN: HCPCS | Performed by: ORTHOPAEDIC SURGERY

## 2020-10-22 PROCEDURE — 99218 HC RM OBSERVATION: CPT

## 2020-10-22 PROCEDURE — 77030033067 HC SUT PDO STRATFX SPIR J&J -B: Performed by: ORTHOPAEDIC SURGERY

## 2020-10-22 PROCEDURE — 74011250637 HC RX REV CODE- 250/637: Performed by: ORTHOPAEDIC SURGERY

## 2020-10-22 PROCEDURE — 74011000250 HC RX REV CODE- 250: Performed by: ORTHOPAEDIC SURGERY

## 2020-10-22 PROCEDURE — 2709999900 HC NON-CHARGEABLE SUPPLY: Performed by: ORTHOPAEDIC SURGERY

## 2020-10-22 PROCEDURE — 77030005513 HC CATH URETH FOL11 MDII -B: Performed by: ORTHOPAEDIC SURGERY

## 2020-10-22 PROCEDURE — 77030010507 HC ADH SKN DERMBND J&J -B: Performed by: ORTHOPAEDIC SURGERY

## 2020-10-22 PROCEDURE — 97116 GAIT TRAINING THERAPY: CPT | Performed by: PHYSICAL THERAPIST

## 2020-10-22 PROCEDURE — 2709999900 HC NON-CHARGEABLE SUPPLY

## 2020-10-22 PROCEDURE — 74011250637 HC RX REV CODE- 250/637: Performed by: PHYSICIAN ASSISTANT

## 2020-10-22 PROCEDURE — 77030040361 HC SLV COMPR DVT MDII -B

## 2020-10-22 DEVICE — DOMED TRI-PEG PATELLA, 38X9MM, E-PLUS
Type: IMPLANTABLE DEVICE | Site: KNEE | Status: FUNCTIONAL
Brand: DJO SURGICAL

## 2020-10-22 DEVICE — EMPOWR 3D KNEETM INS, 8L 10MM, VE
Type: IMPLANTABLE DEVICE | Site: KNEE | Status: FUNCTIONAL
Brand: DJO SURGICAL

## 2020-10-22 DEVICE — SMARTSET GMV HIGH PERFORMANCE GENTAMICIN MEDIUM VISCOSITY BONE CEMENT 40G
Type: IMPLANTABLE DEVICE | Site: KNEE | Status: FUNCTIONAL
Brand: SMARTSET

## 2020-10-22 DEVICE — DJO EMPOWR KNEETM, FIN BP, NP, 8L
Type: IMPLANTABLE DEVICE | Site: KNEE | Status: FUNCTIONAL
Brand: DJO SURGICAL

## 2020-10-22 DEVICE — TOTAL KNEE REPLACEMENT KIT PRIMARY E-PLUS: Type: IMPLANTABLE DEVICE | Status: FUNCTIONAL

## 2020-10-22 DEVICE — EMPOWR 3D KNEETM FEMUR, NP, 7L
Type: IMPLANTABLE DEVICE | Site: KNEE | Status: FUNCTIONAL
Brand: DJO SURGICAL

## 2020-10-22 RX ORDER — FACIAL-BODY WIPES
10 EACH TOPICAL DAILY PRN
Status: DISCONTINUED | OUTPATIENT
Start: 2020-10-24 | End: 2020-10-23 | Stop reason: HOSPADM

## 2020-10-22 RX ORDER — VANCOMYCIN/0.9 % SOD CHLORIDE 1.5G/250ML
1500 PLASTIC BAG, INJECTION (ML) INTRAVENOUS
Status: COMPLETED | OUTPATIENT
Start: 2020-10-22 | End: 2020-10-22

## 2020-10-22 RX ORDER — AMOXICILLIN 250 MG
1 CAPSULE ORAL 2 TIMES DAILY
Status: DISCONTINUED | OUTPATIENT
Start: 2020-10-22 | End: 2020-10-23 | Stop reason: HOSPADM

## 2020-10-22 RX ORDER — SODIUM CHLORIDE 9 MG/ML
50 INJECTION, SOLUTION INTRAVENOUS CONTINUOUS
Status: DISCONTINUED | OUTPATIENT
Start: 2020-10-22 | End: 2020-10-22 | Stop reason: HOSPADM

## 2020-10-22 RX ORDER — OXYCODONE HYDROCHLORIDE 5 MG/1
2.5 TABLET ORAL
Status: DISCONTINUED | OUTPATIENT
Start: 2020-10-22 | End: 2020-10-23 | Stop reason: HOSPADM

## 2020-10-22 RX ORDER — SODIUM CHLORIDE 0.9 % (FLUSH) 0.9 %
5-40 SYRINGE (ML) INJECTION AS NEEDED
Status: DISCONTINUED | OUTPATIENT
Start: 2020-10-22 | End: 2020-10-23 | Stop reason: HOSPADM

## 2020-10-22 RX ORDER — ONDANSETRON 2 MG/ML
4 INJECTION INTRAMUSCULAR; INTRAVENOUS AS NEEDED
Status: DISCONTINUED | OUTPATIENT
Start: 2020-10-22 | End: 2020-10-22 | Stop reason: HOSPADM

## 2020-10-22 RX ORDER — PREGABALIN 75 MG/1
75 CAPSULE ORAL ONCE
Status: COMPLETED | OUTPATIENT
Start: 2020-10-22 | End: 2020-10-22

## 2020-10-22 RX ORDER — HYDROXYZINE HYDROCHLORIDE 10 MG/1
10 TABLET, FILM COATED ORAL
Status: DISCONTINUED | OUTPATIENT
Start: 2020-10-22 | End: 2020-10-23 | Stop reason: HOSPADM

## 2020-10-22 RX ORDER — POLYETHYLENE GLYCOL 3350 17 G/17G
17 POWDER, FOR SOLUTION ORAL DAILY
Status: DISCONTINUED | OUTPATIENT
Start: 2020-10-22 | End: 2020-10-23 | Stop reason: HOSPADM

## 2020-10-22 RX ORDER — VANCOMYCIN/0.9 % SOD CHLORIDE 1.5G/250ML
1500 PLASTIC BAG, INJECTION (ML) INTRAVENOUS EVERY 12 HOURS
Status: COMPLETED | OUTPATIENT
Start: 2020-10-22 | End: 2020-10-22

## 2020-10-22 RX ORDER — SODIUM CHLORIDE, SODIUM LACTATE, POTASSIUM CHLORIDE, CALCIUM CHLORIDE 600; 310; 30; 20 MG/100ML; MG/100ML; MG/100ML; MG/100ML
INJECTION, SOLUTION INTRAVENOUS
Status: DISCONTINUED | OUTPATIENT
Start: 2020-10-22 | End: 2020-10-22 | Stop reason: HOSPADM

## 2020-10-22 RX ORDER — OXYCODONE HYDROCHLORIDE 5 MG/1
2.5-5 TABLET ORAL
Qty: 42 TAB | Refills: 0 | Status: SHIPPED | OUTPATIENT
Start: 2020-10-22 | End: 2020-10-29

## 2020-10-22 RX ORDER — EPHEDRINE SULFATE/0.9% NACL/PF 50 MG/5 ML
SYRINGE (ML) INTRAVENOUS AS NEEDED
Status: DISCONTINUED | OUTPATIENT
Start: 2020-10-22 | End: 2020-10-22 | Stop reason: HOSPADM

## 2020-10-22 RX ORDER — NALOXONE HYDROCHLORIDE 0.4 MG/ML
0.4 INJECTION, SOLUTION INTRAMUSCULAR; INTRAVENOUS; SUBCUTANEOUS AS NEEDED
Status: DISCONTINUED | OUTPATIENT
Start: 2020-10-22 | End: 2020-10-23 | Stop reason: HOSPADM

## 2020-10-22 RX ORDER — SODIUM CHLORIDE 0.9 % (FLUSH) 0.9 %
5-40 SYRINGE (ML) INJECTION EVERY 8 HOURS
Status: DISCONTINUED | OUTPATIENT
Start: 2020-10-22 | End: 2020-10-23 | Stop reason: HOSPADM

## 2020-10-22 RX ORDER — SODIUM CHLORIDE 9 MG/ML
125 INJECTION, SOLUTION INTRAVENOUS CONTINUOUS
Status: DISPENSED | OUTPATIENT
Start: 2020-10-22 | End: 2020-10-23

## 2020-10-22 RX ORDER — SODIUM CHLORIDE 0.9 % (FLUSH) 0.9 %
5-40 SYRINGE (ML) INJECTION AS NEEDED
Status: DISCONTINUED | OUTPATIENT
Start: 2020-10-22 | End: 2020-10-22 | Stop reason: HOSPADM

## 2020-10-22 RX ORDER — ROPIVACAINE HYDROCHLORIDE 5 MG/ML
INJECTION, SOLUTION EPIDURAL; INFILTRATION; PERINEURAL
Status: COMPLETED | OUTPATIENT
Start: 2020-10-22 | End: 2020-10-22

## 2020-10-22 RX ORDER — HYDROMORPHONE HYDROCHLORIDE 1 MG/ML
0.2 INJECTION, SOLUTION INTRAMUSCULAR; INTRAVENOUS; SUBCUTANEOUS
Status: DISCONTINUED | OUTPATIENT
Start: 2020-10-22 | End: 2020-10-22 | Stop reason: HOSPADM

## 2020-10-22 RX ORDER — KETOROLAC TROMETHAMINE 30 MG/ML
15 INJECTION, SOLUTION INTRAMUSCULAR; INTRAVENOUS EVERY 6 HOURS
Status: DISCONTINUED | OUTPATIENT
Start: 2020-10-22 | End: 2020-10-23 | Stop reason: HOSPADM

## 2020-10-22 RX ORDER — FENTANYL CITRATE 50 UG/ML
25 INJECTION, SOLUTION INTRAMUSCULAR; INTRAVENOUS
Status: DISCONTINUED | OUTPATIENT
Start: 2020-10-22 | End: 2020-10-22 | Stop reason: HOSPADM

## 2020-10-22 RX ORDER — CELECOXIB 200 MG/1
200 CAPSULE ORAL ONCE
Status: COMPLETED | OUTPATIENT
Start: 2020-10-22 | End: 2020-10-22

## 2020-10-22 RX ORDER — ACETAMINOPHEN 500 MG
500 TABLET ORAL EVERY 4 HOURS
Qty: 100 TAB | Refills: 0 | Status: SHIPPED
Start: 2020-10-22

## 2020-10-22 RX ORDER — OXYCODONE HYDROCHLORIDE 5 MG/1
5 TABLET ORAL
Status: DISCONTINUED | OUTPATIENT
Start: 2020-10-22 | End: 2020-10-23 | Stop reason: HOSPADM

## 2020-10-22 RX ORDER — ACETAMINOPHEN 500 MG
500 TABLET ORAL
Status: DISCONTINUED | OUTPATIENT
Start: 2020-10-22 | End: 2020-10-23 | Stop reason: HOSPADM

## 2020-10-22 RX ORDER — METOCLOPRAMIDE HYDROCHLORIDE 5 MG/ML
INJECTION INTRAMUSCULAR; INTRAVENOUS AS NEEDED
Status: DISCONTINUED | OUTPATIENT
Start: 2020-10-22 | End: 2020-10-22 | Stop reason: HOSPADM

## 2020-10-22 RX ORDER — MIDAZOLAM HYDROCHLORIDE 1 MG/ML
0.5 INJECTION, SOLUTION INTRAMUSCULAR; INTRAVENOUS
Status: DISCONTINUED | OUTPATIENT
Start: 2020-10-22 | End: 2020-10-22 | Stop reason: HOSPADM

## 2020-10-22 RX ORDER — DEXAMETHASONE SODIUM PHOSPHATE 4 MG/ML
INJECTION, SOLUTION INTRA-ARTICULAR; INTRALESIONAL; INTRAMUSCULAR; INTRAVENOUS; SOFT TISSUE AS NEEDED
Status: DISCONTINUED | OUTPATIENT
Start: 2020-10-22 | End: 2020-10-22 | Stop reason: HOSPADM

## 2020-10-22 RX ORDER — SODIUM CHLORIDE, SODIUM LACTATE, POTASSIUM CHLORIDE, CALCIUM CHLORIDE 600; 310; 30; 20 MG/100ML; MG/100ML; MG/100ML; MG/100ML
125 INJECTION, SOLUTION INTRAVENOUS CONTINUOUS
Status: DISCONTINUED | OUTPATIENT
Start: 2020-10-22 | End: 2020-10-22 | Stop reason: HOSPADM

## 2020-10-22 RX ORDER — ASPIRIN 81 MG/1
81 TABLET ORAL 2 TIMES DAILY
Status: DISCONTINUED | OUTPATIENT
Start: 2020-10-22 | End: 2020-10-23 | Stop reason: HOSPADM

## 2020-10-22 RX ORDER — EPHEDRINE SULFATE/0.9% NACL/PF 50 MG/5 ML
5 SYRINGE (ML) INTRAVENOUS AS NEEDED
Status: DISCONTINUED | OUTPATIENT
Start: 2020-10-22 | End: 2020-10-22 | Stop reason: HOSPADM

## 2020-10-22 RX ORDER — BUPIVACAINE HYDROCHLORIDE 5 MG/ML
INJECTION, SOLUTION EPIDURAL; INTRACAUDAL AS NEEDED
Status: DISCONTINUED | OUTPATIENT
Start: 2020-10-22 | End: 2020-10-22 | Stop reason: HOSPADM

## 2020-10-22 RX ORDER — SODIUM CHLORIDE 0.9 % (FLUSH) 0.9 %
5-40 SYRINGE (ML) INJECTION EVERY 8 HOURS
Status: DISCONTINUED | OUTPATIENT
Start: 2020-10-22 | End: 2020-10-22 | Stop reason: HOSPADM

## 2020-10-22 RX ORDER — AMLODIPINE BESYLATE 5 MG/1
5 TABLET ORAL DAILY
Status: DISCONTINUED | OUTPATIENT
Start: 2020-10-23 | End: 2020-10-23 | Stop reason: HOSPADM

## 2020-10-22 RX ORDER — ACETAMINOPHEN 325 MG/1
650 TABLET ORAL ONCE
Status: DISCONTINUED | OUTPATIENT
Start: 2020-10-22 | End: 2020-10-22 | Stop reason: HOSPADM

## 2020-10-22 RX ORDER — CEFAZOLIN SODIUM/WATER 2 G/20 ML
2 SYRINGE (ML) INTRAVENOUS ONCE
Status: COMPLETED | OUTPATIENT
Start: 2020-10-22 | End: 2020-10-22

## 2020-10-22 RX ORDER — LIDOCAINE HYDROCHLORIDE 10 MG/ML
0.1 INJECTION, SOLUTION EPIDURAL; INFILTRATION; INTRACAUDAL; PERINEURAL AS NEEDED
Status: DISCONTINUED | OUTPATIENT
Start: 2020-10-22 | End: 2020-10-22 | Stop reason: HOSPADM

## 2020-10-22 RX ORDER — PROPOFOL 10 MG/ML
INJECTION, EMULSION INTRAVENOUS
Status: DISCONTINUED | OUTPATIENT
Start: 2020-10-22 | End: 2020-10-22 | Stop reason: HOSPADM

## 2020-10-22 RX ORDER — MIDAZOLAM HYDROCHLORIDE 1 MG/ML
1 INJECTION, SOLUTION INTRAMUSCULAR; INTRAVENOUS AS NEEDED
Status: DISCONTINUED | OUTPATIENT
Start: 2020-10-22 | End: 2020-10-22 | Stop reason: HOSPADM

## 2020-10-22 RX ORDER — AMOXICILLIN 250 MG
1 CAPSULE ORAL
Qty: 60 TAB | Refills: 0 | Status: SHIPPED | OUTPATIENT
Start: 2020-10-22

## 2020-10-22 RX ORDER — PANTOPRAZOLE SODIUM 20 MG/1
20 TABLET, DELAYED RELEASE ORAL
Status: DISCONTINUED | OUTPATIENT
Start: 2020-10-23 | End: 2020-10-23 | Stop reason: HOSPADM

## 2020-10-22 RX ORDER — CEFAZOLIN SODIUM/WATER 2 G/20 ML
2 SYRINGE (ML) INTRAVENOUS EVERY 8 HOURS
Status: COMPLETED | OUTPATIENT
Start: 2020-10-22 | End: 2020-10-23

## 2020-10-22 RX ORDER — ASPIRIN 81 MG/1
81 TABLET ORAL 2 TIMES DAILY
Qty: 60 TAB | Refills: 0 | Status: SHIPPED | OUTPATIENT
Start: 2020-10-22

## 2020-10-22 RX ORDER — LOSARTAN POTASSIUM 50 MG/1
50 TABLET ORAL DAILY
Status: DISCONTINUED | OUTPATIENT
Start: 2020-10-22 | End: 2020-10-23 | Stop reason: HOSPADM

## 2020-10-22 RX ORDER — ONDANSETRON 2 MG/ML
4 INJECTION INTRAMUSCULAR; INTRAVENOUS
Status: ACTIVE | OUTPATIENT
Start: 2020-10-22 | End: 2020-10-23

## 2020-10-22 RX ORDER — ONDANSETRON 2 MG/ML
INJECTION INTRAMUSCULAR; INTRAVENOUS AS NEEDED
Status: DISCONTINUED | OUTPATIENT
Start: 2020-10-22 | End: 2020-10-22 | Stop reason: HOSPADM

## 2020-10-22 RX ORDER — OXYCODONE AND ACETAMINOPHEN 5; 325 MG/1; MG/1
1 TABLET ORAL AS NEEDED
Status: DISCONTINUED | OUTPATIENT
Start: 2020-10-22 | End: 2020-10-22 | Stop reason: HOSPADM

## 2020-10-22 RX ORDER — MORPHINE SULFATE 10 MG/ML
2 INJECTION, SOLUTION INTRAMUSCULAR; INTRAVENOUS
Status: DISCONTINUED | OUTPATIENT
Start: 2020-10-22 | End: 2020-10-22 | Stop reason: HOSPADM

## 2020-10-22 RX ORDER — HYDROMORPHONE HYDROCHLORIDE 1 MG/ML
0.5 INJECTION, SOLUTION INTRAMUSCULAR; INTRAVENOUS; SUBCUTANEOUS
Status: ACTIVE | OUTPATIENT
Start: 2020-10-22 | End: 2020-10-23

## 2020-10-22 RX ORDER — GLYCOPYRROLATE 0.2 MG/ML
INJECTION INTRAMUSCULAR; INTRAVENOUS AS NEEDED
Status: DISCONTINUED | OUTPATIENT
Start: 2020-10-22 | End: 2020-10-22 | Stop reason: HOSPADM

## 2020-10-22 RX ORDER — VANCOMYCIN/0.9 % SOD CHLORIDE 1.5G/250ML
1500 PLASTIC BAG, INJECTION (ML) INTRAVENOUS ONCE
Status: DISCONTINUED | OUTPATIENT
Start: 2020-10-22 | End: 2020-10-22 | Stop reason: SDUPTHER

## 2020-10-22 RX ORDER — DIPHENHYDRAMINE HYDROCHLORIDE 50 MG/ML
12.5 INJECTION, SOLUTION INTRAMUSCULAR; INTRAVENOUS AS NEEDED
Status: DISCONTINUED | OUTPATIENT
Start: 2020-10-22 | End: 2020-10-22 | Stop reason: HOSPADM

## 2020-10-22 RX ORDER — FENTANYL CITRATE 50 UG/ML
50 INJECTION, SOLUTION INTRAMUSCULAR; INTRAVENOUS AS NEEDED
Status: DISCONTINUED | OUTPATIENT
Start: 2020-10-22 | End: 2020-10-22 | Stop reason: HOSPADM

## 2020-10-22 RX ORDER — ACETAMINOPHEN 500 MG
1000 TABLET ORAL ONCE
Status: COMPLETED | OUTPATIENT
Start: 2020-10-22 | End: 2020-10-22

## 2020-10-22 RX ORDER — SODIUM CHLORIDE 9 MG/ML
1000 INJECTION, SOLUTION INTRAVENOUS CONTINUOUS
Status: DISCONTINUED | OUTPATIENT
Start: 2020-10-22 | End: 2020-10-22 | Stop reason: HOSPADM

## 2020-10-22 RX ADMIN — DEXMEDETOMIDINE HYDROCHLORIDE 4 MCG: 100 INJECTION, SOLUTION, CONCENTRATE INTRAVENOUS at 07:32

## 2020-10-22 RX ADMIN — BUPIVACAINE HYDROCHLORIDE 10 MG: 5 INJECTION, SOLUTION EPIDURAL; INTRACAUDAL; PERINEURAL at 07:37

## 2020-10-22 RX ADMIN — KETOROLAC TROMETHAMINE 15 MG: 30 INJECTION, SOLUTION INTRAMUSCULAR at 16:24

## 2020-10-22 RX ADMIN — OXYCODONE 5 MG: 5 TABLET ORAL at 19:28

## 2020-10-22 RX ADMIN — DEXMEDETOMIDINE HYDROCHLORIDE 4 MCG: 100 INJECTION, SOLUTION, CONCENTRATE INTRAVENOUS at 07:31

## 2020-10-22 RX ADMIN — PROPOFOL 40 MCG/KG/MIN: 10 INJECTION, EMULSION INTRAVENOUS at 07:30

## 2020-10-22 RX ADMIN — FENTANYL CITRATE 75 MCG: 50 INJECTION, SOLUTION INTRAMUSCULAR; INTRAVENOUS at 07:21

## 2020-10-22 RX ADMIN — SODIUM CHLORIDE 125 ML/HR: 900 INJECTION, SOLUTION INTRAVENOUS at 19:28

## 2020-10-22 RX ADMIN — SODIUM CHLORIDE, POTASSIUM CHLORIDE, SODIUM LACTATE AND CALCIUM CHLORIDE: 600; 310; 30; 20 INJECTION, SOLUTION INTRAVENOUS at 07:00

## 2020-10-22 RX ADMIN — ACETAMINOPHEN 500 MG: 500 TABLET ORAL at 17:57

## 2020-10-22 RX ADMIN — VANCOMYCIN HYDROCHLORIDE 1500 MG: 100 INJECTION, POWDER, LYOPHILIZED, FOR SOLUTION INTRAVENOUS at 19:31

## 2020-10-22 RX ADMIN — Medication 2 G: at 07:39

## 2020-10-22 RX ADMIN — DEXAMETHASONE SODIUM PHOSPHATE 4 MG: 4 INJECTION, SOLUTION INTRAMUSCULAR; INTRAVENOUS at 08:41

## 2020-10-22 RX ADMIN — TRANEXAMIC ACID 1 G: 100 INJECTION, SOLUTION INTRAVENOUS at 07:44

## 2020-10-22 RX ADMIN — ACETAMINOPHEN 1000 MG: 500 TABLET ORAL at 06:48

## 2020-10-22 RX ADMIN — PREGABALIN 75 MG: 75 CAPSULE ORAL at 06:48

## 2020-10-22 RX ADMIN — GLYCOPYRROLATE 0.2 MG: 0.2 INJECTION, SOLUTION INTRAMUSCULAR; INTRAVENOUS at 07:37

## 2020-10-22 RX ADMIN — METOCLOPRAMIDE 5 MG: 5 INJECTION, SOLUTION INTRAMUSCULAR; INTRAVENOUS at 08:27

## 2020-10-22 RX ADMIN — ASPIRIN 81 MG: 81 TABLET, COATED ORAL at 17:57

## 2020-10-22 RX ADMIN — VANCOMYCIN HYDROCHLORIDE 1500 MG: 10 INJECTION, POWDER, LYOPHILIZED, FOR SOLUTION INTRAVENOUS at 06:57

## 2020-10-22 RX ADMIN — MIDAZOLAM 2 MG: 1 INJECTION INTRAMUSCULAR; INTRAVENOUS at 07:21

## 2020-10-22 RX ADMIN — ROPIVACAINE HYDROCHLORIDE 25 ML: 5 INJECTION, SOLUTION EPIDURAL; INFILTRATION; PERINEURAL at 07:25

## 2020-10-22 RX ADMIN — PHENYLEPHRINE HYDROCHLORIDE 40 MCG/MIN: 10 INJECTION INTRAVENOUS at 07:36

## 2020-10-22 RX ADMIN — ACETAMINOPHEN 500 MG: 500 TABLET ORAL at 13:35

## 2020-10-22 RX ADMIN — CELECOXIB 200 MG: 200 CAPSULE ORAL at 06:48

## 2020-10-22 RX ADMIN — DEXMEDETOMIDINE HYDROCHLORIDE 0.5 MCG/KG/HR: 100 INJECTION, SOLUTION, CONCENTRATE INTRAVENOUS at 07:30

## 2020-10-22 RX ADMIN — CEFAZOLIN SODIUM 2 G: 300 INJECTION, POWDER, LYOPHILIZED, FOR SOLUTION INTRAVENOUS at 16:23

## 2020-10-22 RX ADMIN — VANCOMYCIN HYDROCHLORIDE 1500 MG: 10 INJECTION, POWDER, LYOPHILIZED, FOR SOLUTION INTRAVENOUS at 07:39

## 2020-10-22 RX ADMIN — Medication 25 MG: at 09:12

## 2020-10-22 RX ADMIN — DEXMEDETOMIDINE HYDROCHLORIDE 4 MCG: 100 INJECTION, SOLUTION, CONCENTRATE INTRAVENOUS at 07:33

## 2020-10-22 RX ADMIN — SODIUM CHLORIDE 125 ML/HR: 900 INJECTION, SOLUTION INTRAVENOUS at 10:48

## 2020-10-22 RX ADMIN — ONDANSETRON HYDROCHLORIDE 4 MG: 2 INJECTION, SOLUTION INTRAMUSCULAR; INTRAVENOUS at 08:43

## 2020-10-22 RX ADMIN — SODIUM CHLORIDE, SODIUM LACTATE, POTASSIUM CHLORIDE, AND CALCIUM CHLORIDE 125 ML/HR: 600; 310; 30; 20 INJECTION, SOLUTION INTRAVENOUS at 07:04

## 2020-10-22 NOTE — PROGRESS NOTES
Problem: Mobility Impaired (Adult and Pediatric)  Goal: *Acute Goals and Plan of Care (Insert Text)  Description: FUNCTIONAL STATUS PRIOR TO ADMISSION: Patient was independent and active without use of DME.    HOME SUPPORT PRIOR TO ADMISSION: The patient lived with wife but did not require assist.    Physical Therapy Goals  Initiated 10/22/2020    1. Patient will move from supine to sit and sit to supine  in bed with modified independence within 4 days. 2. Patient will perform sit to stand with modified independence within 4 days. 3. Patient will ambulate with modified independence for 250 feet with the least restrictive device within 4 days. 4. Patient will ascend/descend 4 stairs with 2 handrail(s) with modified independence within 4 days. 5. Patient will perform home exercise program per protocol with modified independence within 4 days. 6. Patient will demonstrate AROM 0-90 degrees in operative joint within 4 days. Outcome: Progressing Towards Goal   PHYSICAL THERAPY EVALUATION  Patient: Jerrell Boyer (22 y.o. male)  Date: 10/22/2020  Primary Diagnosis: Localized osteoarthritis of left knee [M17.12]  Procedure(s) (LRB):  LEFT TOTAL KNEE ARTHROPLASTY (Left) Day of Surgery   Precautions:   Fall, WBAT    ASSESSMENT  Based on the objective data described below, the patient presents with decreased functional mobility from baseline level of function. Patient currently moving well and limited only by pain. Currently needing supervision for bed mobility and CGA for transfers. Amb approx 100 feet with RW and CGA with no overt LOB. Patient will likely progress quickly and will benefit from [facility] PT follow up. Other factors to consider for discharge: none     Patient will benefit from skilled therapy intervention to address the above noted impairments.        PLAN :  Recommendations and Planned Interventions: bed mobility training, transfer training, gait training, therapeutic exercises, neuromuscular re-education, patient and family training/education, and therapeutic activities      Frequency/Duration: Patient will be followed by physical therapy:  twice daily to address goals. Recommendation for discharge: (in order for the patient to meet his/her long term goals)  Physical therapy at least 2 days/week in the home       IF patient discharges home will need the following DME: patient owns DME required for discharge         SUBJECTIVE:   Patient stated I'm feeling good.     OBJECTIVE DATA SUMMARY:   HISTORY:    Past Medical History:   Diagnosis Date    Arthritis     Cancer ([facility] [address] 10/2018    PROSTATE    Chronic pain     GERD (gastroesophageal reflux disease)     Hypertension     Kidney stones      Past Surgical History:   Procedure Laterality Date    HX KNEE ARTHROSCOPY Left     HX PROSTATE SURGERY      PROSTATE SEEDS IMPLANTED        Personal factors and/or comorbidities impacting plan of care:     Home Situation  Home Environment: Private residence  # Steps to Enter: 4  Rails to Enter: Yes  Hand Rails : Bilateral  One/Two Story Residence: One story  Living Alone: No  Support Systems: Spouse/Significant Other/Partner  Patient Expects to be Discharged to[de-identified] Private residence  Current DME Used/Available at Home: Cane, straight, Raised toilet seat, Shower chair, Walker, rolling  Tub or Shower Type: Shower    EXAMINATION/PRESENTATION/DECISION MAKING:   Critical Behavior:  Neurologic State: Alert          Range Of Motion:  AROM: Generally decreased, functional                       Strength:    Strength: Generally decreased, functional       Functional Mobility:  Bed Mobility:  Rolling: Supervision  Supine to Sit: Supervision  Sit to Supine: Supervision  Scooting: Supervision  Transfers:  Sit to Stand: Contact guard assistance;Assist x1  Stand to Sit: Contact guard assistance;Assist x1                       Balance:   Sitting: Intact  Standing: Impaired  Standing - Static: Constant support;Good  Standing - Dynamic : Constant support; Fair  Ambulation/Gait Training:  Distance (ft): 100 Feet (ft)  Assistive Device: Gait belt;Walker, rolling  Ambulation - Level of Assistance: Contact guard assistance     Gait Description (WDL): Exceptions to WDL  Gait Abnormalities: Decreased step clearance; Step to gait     Left Side Weight Bearing: As tolerated  Base of Support: Center of gravity altered; Widened     Speed/Janie: Pace decreased (<100 feet/min); Shuffled; Slow  Step Length: Left shortened;Right shortened       Pain Rating:  No c/o pain    Activity Tolerance:   Good and requires rest breaks  Please refer to the flowsheet for vital signs taken during this treatment. After treatment patient left in no apparent distress:   Supine in bed, Call bell within reach, and Caregiver / family present    COMMUNICATION/EDUCATION:   The patients plan of care was discussed with: Physical therapist, Occupational therapist, and Registered nurse. Fall prevention education was provided and the patient/caregiver indicated understanding., Patient/family have participated as able in goal setting and plan of care. , and Patient/family agree to work toward stated goals and plan of care.     Thank you for this referral.  Colin Stoen, PT, DPT   Time Calculation: 16 mins

## 2020-10-22 NOTE — PERIOP NOTES
TRANSFER - OUT REPORT:    Verbal report given to Margie Do (name) on Angélica Espinal  being transferred to 564(unit) for routine post - op       Report consisted of patients Situation, Background, Assessment and   Recommendations(SBAR). Time Pre op antibiotic given:0739  Anesthesia Stop time: 5914  Powell Present on Transfer to floor:n  Order for Powell on Chart:n  Discharge Prescriptions with Chart:n    Information from the following report(s) SBAR, OR Summary, Intake/Output, MAR and Accordion was reviewed with the receiving nurse. Opportunity for questions and clarification was provided. Is the patient on 02? NO       L/Min        Other     Is the patient on a monitor? NO    Is the nurse transporting with the patient? NO    Surgical Waiting Area notified of patient's transfer from PACU? NO      The following personal items collected during your admission accompanied patient upon transfer:   Dental Appliance: Dental Appliances: (S) (partials left in, went to OR with the pink container.)  Vision:    Hearing Aid:    Jewelry: Jewelry: None  Clothing: Clothing: (clothes bag to pacu)  Other Valuables:  Other Valuables: Eyeglasses  Valuables sent to safe:        Partials In pt's mouth  Clothes and glasses to floor with pt

## 2020-10-22 NOTE — ANESTHESIA POSTPROCEDURE EVALUATION
Post-Anesthesia Evaluation and Assessment    Patient: Chavo Mc MRN: [id number]  SSN: [id number]    YOB: 1945  Age: 75 y.o. Sex: male      I have evaluated the patient and they are stable and ready for discharge from the PACU. Cardiovascular Function/Vital Signs  Visit Vitals  BP (!) 96/55   Pulse 82   Temp 36.7 °C (98.1 °F)   Resp 17   Wt 101.3 kg (223 lb 5.2 oz)   SpO2 99%   BMI 32.04 kg/m²       Patient is status post Spinal anesthesia for Procedure(s):  LEFT TOTAL KNEE ARTHROPLASTY. Nausea/Vomiting: None    Postoperative hydration reviewed and adequate. Pain:  Pain Scale 1: Numeric (0 - 10) (10/22/20 1034)  Pain Intensity 1: 0 (10/22/20 1034)   Managed    Neurological Status:   Neuro (WDL): Within Defined Limits (10/22/20 1034)  Neuro  Neurologic State: Alert (10/22/20 1034)  LUE Motor Response: Purposeful (10/22/20 1034)  LLE Motor Response: Purposeful (10/22/20 1034)  RUE Motor Response: Purposeful (10/22/20 1034)  RLE Motor Response: Purposeful (10/22/20 1034)   At baseline    Mental Status, Level of Consciousness: Alert and  oriented to person, place, and time    Pulmonary Status:   O2 Device: Nasal cannula (10/22/20 0958)   Adequate oxygenation and airway patent    Complications related to anesthesia: None    Post-anesthesia assessment completed. No concerns    Signed By: Raegan Juarez MD     October 22, 2020              Procedure(s):  LEFT TOTAL KNEE ARTHROPLASTY.     spinal    Anesthesia Post Evaluation      Multimodal analgesia: multimodal analgesia used between 6 hours prior to anesthesia start to PACU discharge  Patient location during evaluation: PACU  Patient participation: complete - patient participated  Level of consciousness: awake  Pain score: 2  Pain management: adequate  Airway patency: patent  Anesthetic complications: no  Cardiovascular status: acceptable  Respiratory status: acceptable  Hydration status: acceptable  Comments: I have evaluated the patient and meets criteria for discharge from PACU. Fior Varma MD  Post anesthesia nausea and vomiting:  controlled      INITIAL Post-op Vital signs:   Vitals Value Taken Time   BP 93/61 10/22/2020 10:30 AM   Temp 36.7 °C (98.1 °F) 10/22/2020  9:58 AM   Pulse 90 10/22/2020 10:40 AM   Resp 16 10/22/2020 10:40 AM   SpO2 100 % 10/22/2020 10:40 AM   Vitals shown include unvalidated device data.

## 2020-10-22 NOTE — ANESTHESIA PROCEDURE NOTES
Spinal Block    Start time: 10/22/2020 7:30 AM  End time: 10/22/2020 7:36 AM  Performed by: Jennifer Wilkinson MD  Authorized by: Jennifer Wilkinson MD     Pre-procedure:   Indications: primary anesthetic  Preanesthetic Checklist: patient identified, risks and benefits discussed, anesthesia consent, site marked, patient being monitored and timeout performed    Timeout Time: 07:30          Spinal Block:   Patient Position:  Seated  Prep Region:  Lumbar  Prep: Betadine      Location:  L3-4  Technique:  Single shot        Needle:   Needle Type:  Pencil-tip  Needle Gauge:  25 G  Attempts:  1      Events: CSF confirmed, no blood with aspiration and no paresthesia        Assessment:  Insertion:  Uncomplicated  Patient tolerance:  Patient tolerated the procedure well with no immediate complications

## 2020-10-22 NOTE — DISCHARGE INSTRUCTIONS
Post-op Discharge Instructions Following Total Joint Replacement  Jackie Soto MD  [address]  [phone], ext [phone]  Follow-up Office Visit   See Dr. Macarena Oliver approximately 3-4 weeks from date of surgery. Call [phone], ext [phone] to make an appointment. Activity   Use your walker for ambulation. Weight bearing as tolerated unless instructed otherwise by the physical therapist. Get up every hour you are awake and take a brief walk. Lengthen walking distance daily as your strength improves.  Continue using your walker until seen in the office for your first follow up visit.  Practice your exercises 3 times daily as instructed by the physical therapist. [place] for 20 minutes after exercising.  No driving until seen in the office for your first follow up visit. Incision Care   The light brown Aquacel surgical dressing is waterproof and is to remain on your incision for 7 days. On the 7th day, carefully lift the edge of the dressing to break the adhesive seal and gently peel it off.  If your Aquacel dressings comes loose or falls off before the 7th day, replace it with a dry sterile gauze dressing and change this dressing daily. Once there is no drainage on the bandage, you mean leave the incision open to air.  You may take a shower with the Aquacel dressing in place. After you remove the Aquacel dressing on day 7, you may continue to shower and get your incision wet in the shower. Do not submerge your incision under water in a bathtub, hot tub, swimming pool, etc. until after you have been evaluated at your first office visit. Medications   Blood Clot Prevention: Take medication as prescribed by your physician for 4 weeks postop.  Pain Management: Take pain medication as prescribed; wean yourself off of pain medication as your pain lessens. Take with food.  You make also take Tylenol every 4-6 hours as needed for pain. Do not exceed 3 grams (3000mg) per day.    Place an ice bag on or around the incision for 20 minutes on / 20 minutes off as needed throughout the day and night, especially after exercising.  Stool Softener: You may want to take a stool softener (such as Senokot-S or Colace) to prevent constipation while taking pain medication. If constipation occurs, you may also use a laxative (such as Dulcolax tablets, Miralax, or a suppository). Diet   Resume usual diet at home. Drink plenty of fluids. Eat foods high in fiber and protein. Calcium and Vitamin D supplements recommended. Avoid alcoholic beverages. No smoking. When to call your Orthopaedic Surgeon: If you call after 5pm or on a weekend, the on call physician will return your call   Pain that is not relieved by pain medication, ice, and activity modification   Signs of infection (red incision, continuous drainage from the incision, malodorous drainage, persistent fever greater than 101 degrees Fahrenheit)   Signs of a blood clot in your leg (calf pain, tenderness, redness, and/or swelling of the lower leg)  ?   When to call your Primary Care Physician   Concerns about your medical conditions such as diabetes, high blood pressure, asthma, congestive heart failure   Call if blood sugars are elevated, if you have a persistent headache or dizziness, coughing or congestion, constipation or diarrhea, burning with urination, abnormal heart rate (fast or slow)  When to call 911 and go to the nearest Emergency Room   Acute onset of chest pain, shortness of breath, difficulty breathing

## 2020-10-22 NOTE — PERIOP NOTES
0720 timeout done with Dr. Mally Arango for a left knee nerve block. Patient consented, marked and placed on monitor. 2 mg of versed and 75 mcg of fentanyl given to patient by Dr. Mally Arango. 25 ml of 0.5% ropivacaine used for the block. Patient taken into OR immediately after.

## 2020-10-22 NOTE — DISCHARGE SUMMARY
[address]     DISCHARGE SUMMARY     Name: Gabby Camacho       MR#: [id number]    : 1945  ADMIT DATE: 10/22/2020  DISCHARGE DATE: 10/23/2020     ADMISSION DIAGNOSIS: Localized osteoarthritis of left knee [M17.12]     DISCHARGE DIAGNOSIS: OA LEFT KNEE     PROCEDURE PERFORMED: Procedure(s):  LEFT TOTAL KNEE ARTHROPLASTY     CONSULTATIONS:  None.     HISTORY OF PRESENT ILLNESS: The patient is a 41-year-old male with progressive left knee pain due to medial compartment osteoarthritis. Symptoms have progressed despite comprehensive conservative treatment. He presents for left partial versus total knee replacement. During our preoperative conversation today, he indicated that he did not want to consider partial replacement, but would rather move forward with total knee replacement. We discussed pros and cons of both procedures, risks and benefits, and he elects to proceed with total knee replacement.     HOSPITAL COURSE:  The patient underwent the aforementioned procedure on date of admission under spinal anesthesia with adductor canal block. There were no immediate postoperative complications. He was started on a multimodal pain regimen and DVT prophylaxis.     DISPOSITION: The patient made slow, steady progress with physical therapy and was appropriate for discharge to Home in stable condition on postoperative day 1. DISCHARGE MEDICATIONS:  Reinitiate preadmission medications. In addition, the patient will be on ASA for DVT prophylaxis and low dose oxycodone and Tylenol for pain. DISCHARGE INSTRUCTIONS:  Detailed printed instructions were provided to the patient. Follow up with Dr. Sowmya Junior in approximately 3 weeks. The patient will receive home health physical therapy in the meantime.     Signed by: Claudean Fillers, PA-C  10/24/2020

## 2020-10-22 NOTE — ANESTHESIA PREPROCEDURE EVALUATION
Relevant Problems   No relevant active problems       Anesthetic History   No history of anesthetic complications            Review of Systems / Medical History  Patient summary reviewed, nursing notes reviewed and pertinent labs reviewed    Pulmonary  Within defined limits                 Neuro/Psych   Within defined limits           Cardiovascular  Within defined limits  Hypertension                   GI/Hepatic/Renal  Within defined limits   GERD           Endo/Other  Within defined limits      Arthritis     Other Findings              Physical Exam    Airway  Mallampati: II  TM Distance: > 6 cm  Neck ROM: normal range of motion   Mouth opening: Normal     Cardiovascular  Regular rate and rhythm,  S1 and S2 normal,  no murmur, click, rub, or gallop             Dental  No notable dental hx       Pulmonary  Breath sounds clear to auscultation               Abdominal  GI exam deferred       Other Findings            Anesthetic Plan    ASA: 2  Anesthesia type: spinal      Post-op pain plan if not by surgeon: peripheral nerve block single    Induction: Intravenous  Anesthetic plan and risks discussed with: Patient

## 2020-10-22 NOTE — ROUTINE PROCESS
Bedside and Verbal shift change report given to Michael Lopez RN (oncoming nurse) by Coreen Morrissey RN (offgoing nurse). Report included the following information SBAR.

## 2020-10-22 NOTE — PROGRESS NOTES
TRANSFER - IN REPORT:    Verbal report received from TiffanieRN(name) on Gwen Kussmaul  being received from PACU(unit) for routine post - op      Report consisted of patients Situation, Background, Assessment and   Recommendations(SBAR). Information from the following report(s) SBAR, Kardex, Procedure Summary, Intake/Output, MAR and Recent Results was reviewed with the receiving nurse. Opportunity for questions and clarification was provided. Assessment completed upon patients arrival to unit and care assumed.

## 2020-10-22 NOTE — ANESTHESIA PROCEDURE NOTES
Peripheral Block    Start time: 10/22/2020 7:20 AM  End time: 10/22/2020 7:28 AM  Performed by: Harsh Nascimento MD  Authorized by: Harsh Nascimento MD       Pre-procedure:    Indications: at surgeon's request, post-op pain management and procedure for pain    Preanesthetic Checklist: patient identified, risks and benefits discussed, site marked, timeout performed, anesthesia consent given and patient being monitored    Timeout Time: 07:20          Block Type:   Block Type:  Popliteal  Monitoring:  Standard ASA monitoring, continuous pulse ox, frequent vital sign checks, heart rate, responsive to questions and oxygen  Injection Technique:  Single shot  Procedures: ultrasound guided and nerve stimulator    Patient Position: supine  Prep: chlorhexidine    Location:  Lower thigh  Needle Type:  Stimuplex  Needle Gauge:  22 G  Needle Localization:  Nerve stimulator and ultrasound guidance  Motor Response comment:   Motor Response: minimal motor response >0.4 mA   Medication Injected:  Ropivacaine (PF) (NAROPIN)(0.5%) 5 mg/mL injection, 25 mL  Med Admin Time: 10/22/2020 7:25 AM    Assessment:  Number of attempts:  1  Injection Assessment:  Incremental injection every 5 mL, local visualized surrounding nerve on ultrasound, negative aspiration for CSF, negative aspiration for blood, no paresthesia and no intravascular symptoms  Patient tolerance:  Patient tolerated the procedure well with no immediate complications

## 2020-10-22 NOTE — PROGRESS NOTES
Problem: Risk for Spread of Infection  Goal: Prevent transmission of infectious organism to others  Description: Prevent the transmission of infectious organisms to other patients, staff members, and visitors. Outcome: Progressing Towards Goal   Patient is currently on modified contact precautions. Problem: Knee Replacement: Day of Surgery/Unit  Goal: Activity/Safety  Outcome: Progressing Towards Goal  Patient ambulated with PT to the door and back. Goal: Medications  Outcome: Progressing Towards Goal  Patient aware and educated on ordered medications. Problem: Falls - Risk of  Goal: *Absence of Falls  Description: Document Ruddy Palmer Fall Risk and appropriate interventions in the flowsheet.   Outcome: Progressing Towards Goal  Note: Fall Risk Interventions:  Mobility Interventions: Patient to call before getting OOB, PT Consult for mobility concerns, PT Consult for assist device competence, OT consult for ADLs      Medication Interventions: Teach patient to arise slowly, Patient to call before getting OOB, Evaluate medications/consider consulting pharmacy    Elimination Interventions: Call light in reach, Patient to call for help with toileting needs

## 2020-10-22 NOTE — BRIEF OP NOTE
Brief Postoperative Note    Patient: Sixto Fothergill  YOB: 1945  MRN: [id number]    Date of Procedure: 10/22/2020     Pre-Op Diagnosis: OA LEFT KNEE    Post-Op Diagnosis: Same as preoperative diagnosis. Procedure(s):  LEFT TOTAL KNEE ARTHROPLASTY    Surgeon(s):  Jakub Ahumada MD    Surgical Assistant: Physician Assistant: Buddy Em PA-C    Anesthesia: Spinal     Estimated Blood Loss (mL): 249    Complications: None    Specimens: * No specimens in log *     Implants:   Implant Name Type Inv.  Item Serial No.  Lot No. LRB No. Used Action   CEMENT BONE W/GENT 40G MEDIUM VISCOSITY - SNA Cement CEMENT BONE W/GENT 40G MEDIUM VISCOSITY NA [employer] [id number] Left 2 Implanted   COMPONENT FEM SZ 7 LT KNEE NP EMPOWR 3D - SNA  COMPONENT FEM SZ 7 LT KNEE NP EMPOWR 3D NA [employer] - [facility] SURGICAL_ [id number] Left 1 Implanted   BASEPLATE TIB SZ 8 LT NP STEMMABLE EMPOWR - SNA  BASEPLATE TIB SZ 8 LT NP STEMMABLE EMPOWR NA [employer] - [employer] SURGICAL_ [id number] Left 1 Implanted   INSERT TIB SZ 8 [id number] LT KNEE EMPOWR 3D E + - SNA  INSERT TIB SZ 8 [id number] LT KNEE EMPOWR 3D E + NA [employer] - [employer] SURGICAL_ [id number] Left 1 Implanted   COMPONENT PAT OD38MM THK9MM POLYETH DOMED STYL TRI PEG EPIK - SNA  COMPONENT PAT OD38MM THK9MM POLYETH DOMED STYL TRI PEG EPIK NA [employer] [facility] SURGICAL_ [id number] Left 1 Implanted       Drains: * No LDAs found *    Findings: left knee OA    Electronically Signed by Linda Meza PA-C on 10/22/2020 at 9:29 AM

## 2020-10-22 NOTE — PROGRESS NOTES
Ortho Post-Op Note    10/22/2020  2:24 PM    POD:  Day of Surgery  S/P:  Procedure(s):  LEFT TOTAL KNEE ARTHROPLASTY    Afebrile/VSS, NAD, A&O x 3  Doing well without complaints of nausea  Pain well controlled  Calves soft/NTTP Bilaterally  Leg soft. Dressing clean and dry  Moving lower extremities well. Neurocirculatory exam intact and within normal range. Lab Results   Component Value Date/Time    HGB 14.7 10/13/2020 09:33 AM    INR 1.0 10/13/2020 09:33 AM     Recent Labs     10/13/20  0933   CREA 0.70   BUN 10     Estimated Creatinine Clearance: 108.7 mL/min (by C-G formula based on SCr of 0.7 mg/dL).     PLAN:  DVT prophylaxis: ASA 81 mg bid  WBAT with PT-mobilization  Pain Control: tylenol, toradol, oxycodone  Plan to D/C home tomorrow      CRISTOFER Powers

## 2020-10-22 NOTE — PROGRESS NOTES
Bedside and Verbal shift change report given to Jackie Bustillos RN (oncoming nurse) by Laura Steen RN (offgoing nurse). Report included the following information SBAR, Kardex, Procedure Summary, Intake/Output, MAR and Recent Results.

## 2020-10-22 NOTE — PROGRESS NOTES
Primary Nurse Reyna Hernández, VONDA and Darrion Kendrick, RN performed a dual skin assessment on this patient No impairment noted  Bravo score is 20

## 2020-10-23 VITALS
BODY MASS INDEX: 32.04 KG/M2 | HEART RATE: 101 BPM | OXYGEN SATURATION: 95 % | TEMPERATURE: 98.1 F | DIASTOLIC BLOOD PRESSURE: 75 MMHG | RESPIRATION RATE: 16 BRPM | WEIGHT: 223.33 LBS | SYSTOLIC BLOOD PRESSURE: 133 MMHG

## 2020-10-23 LAB
ANION GAP SERPL CALC-SCNC: 7 MMOL/L (ref 5–15)
BUN SERPL-MCNC: 13 MG/DL (ref 6–20)
BUN/CREAT SERPL: 19 (ref 12–20)
CALCIUM SERPL-MCNC: 7.9 MG/DL (ref 8.5–10.1)
CHLORIDE SERPL-SCNC: 110 MMOL/L (ref 97–108)
CO2 SERPL-SCNC: 23 MMOL/L (ref 21–32)
CREAT SERPL-MCNC: 0.68 MG/DL (ref 0.7–1.3)
GLUCOSE SERPL-MCNC: 123 MG/DL (ref 65–100)
HGB BLD-MCNC: 9.4 G/DL (ref 12.1–17)
POTASSIUM SERPL-SCNC: 4.3 MMOL/L (ref 3.5–5.1)
SODIUM SERPL-SCNC: 140 MMOL/L (ref 136–145)

## 2020-10-23 PROCEDURE — 99218 HC RM OBSERVATION: CPT

## 2020-10-23 PROCEDURE — 85018 HEMOGLOBIN: CPT

## 2020-10-23 PROCEDURE — 97116 GAIT TRAINING THERAPY: CPT

## 2020-10-23 PROCEDURE — 36415 COLL VENOUS BLD VENIPUNCTURE: CPT

## 2020-10-23 PROCEDURE — 80048 BASIC METABOLIC PNL TOTAL CA: CPT

## 2020-10-23 PROCEDURE — 74011250637 HC RX REV CODE- 250/637: Performed by: PHYSICIAN ASSISTANT

## 2020-10-23 PROCEDURE — 74011000250 HC RX REV CODE- 250: Performed by: PHYSICIAN ASSISTANT

## 2020-10-23 PROCEDURE — 97530 THERAPEUTIC ACTIVITIES: CPT

## 2020-10-23 PROCEDURE — 74011250636 HC RX REV CODE- 250/636: Performed by: PHYSICIAN ASSISTANT

## 2020-10-23 RX ADMIN — Medication 10 ML: at 05:18

## 2020-10-23 RX ADMIN — CEFAZOLIN SODIUM 2 G: 300 INJECTION, POWDER, LYOPHILIZED, FOR SOLUTION INTRAVENOUS at 00:03

## 2020-10-23 RX ADMIN — SODIUM CHLORIDE 125 ML/HR: 900 INJECTION, SOLUTION INTRAVENOUS at 07:09

## 2020-10-23 RX ADMIN — KETOROLAC TROMETHAMINE 15 MG: 30 INJECTION, SOLUTION INTRAMUSCULAR at 05:18

## 2020-10-23 RX ADMIN — OXYCODONE 5 MG: 5 TABLET ORAL at 12:34

## 2020-10-23 RX ADMIN — ACETAMINOPHEN 500 MG: 500 TABLET ORAL at 05:18

## 2020-10-23 RX ADMIN — OXYCODONE 5 MG: 5 TABLET ORAL at 05:18

## 2020-10-23 RX ADMIN — PANTOPRAZOLE SODIUM 20 MG: 20 TABLET, DELAYED RELEASE ORAL at 07:09

## 2020-10-23 RX ADMIN — ACETAMINOPHEN 500 MG: 500 TABLET ORAL at 09:27

## 2020-10-23 RX ADMIN — OXYCODONE 5 MG: 5 TABLET ORAL at 09:27

## 2020-10-23 RX ADMIN — KETOROLAC TROMETHAMINE 15 MG: 30 INJECTION, SOLUTION INTRAMUSCULAR at 00:04

## 2020-10-23 RX ADMIN — ASPIRIN 81 MG: 81 TABLET, COATED ORAL at 09:28

## 2020-10-23 NOTE — OP NOTES
[address]  OPERATIVE REPORT    Name:  Delores Bowers  MR#:  [id number]  :  1945  ACCOUNT #:  [de-identified]  DATE OF SERVICE:  10/22/2020      PREOPERATIVE DIAGNOSIS:  Osteoarthritis, left knee. POSTOPERATIVE DIAGNOSIS:  Osteoarthritis, left knee. PROCEDURE PERFORMED:  Left total knee arthroplasty. SURGEON:  Prince Joshua MD    ASSISTANT:  Leona Clay PA-C    ANESTHESIA:  Spinal with sedation as well as adductor canal block. COMPLICATIONS:  None. SPECIMENS REMOVED:  None. IMPLANTS:  Components implanted, DonJoy Empowr 3-D size 7 femur, size 8 tibial tray with 10-mm polyethylene insert and 38-mm patella. ESTIMATED BLOOD LOSS:  100 mL. INDICATIONS:  The patient is a 41-year-old male with progressive left knee pain due to medial compartment osteoarthritis. Symptoms have progressed despite comprehensive conservative treatment. He presents for left partial versus total knee replacement. During our preoperative conversation today, he indicated that he did not want to consider partial replacement, but would rather move forward with total knee replacement. We discussed pros and cons of both procedures, risks and benefits, and he elects to proceed with total knee replacement. PROCEDURE IN DETAIL:  Anesthesia team placed an adductor canal block in the left thigh before taking the patient to the operating room and they also placed a spinal.  Preoperative IV antibiotics were administered. Padded pneumatic tourniquet was placed around the left upper thigh. The left lower extremity was prepped and draped in usual sterile fashion. Tourniquet was inflated to 275. Through a midline anterior knee incision, I performed a medial parapatellar arthrotomy. Progressive medial release was performed to facilitate exposure and soft tissue balance throughout the procedure.   I took 10 mm off the distal femur using a 5-degree distal femoral cutting block over a long intramedullary tiara. PCL was excised. Proximal tibial resection was performed using an extramedullary alignment guide with goal of 1-2 degrees of constitutional varus. Menisci were excised and progressive medial release was performed. Moderate release was performed off the proximal tibia and release was completed using 14-gauge needle to pie crust and fine tune knee extension gap to produce a symmetrical 10-mm gap checked with a 10-mm spacer block. Knee was placed in 90 degrees of flexion and gap  was inserted. Soft tissue tension was applied and block was adjusted to produce a 10-mm flexion space. Femoral rotation was drilled and the femur was sized and prepared for a size 7 component utilizing appropriate jig. Posterior osteophytes were removed from the femur and subperiosteal posterior capsular release was performed. Trials were inserted and with 10-mm insert in place, the knee had full extension to gravity. Satisfactory coronal plane stability and soft tissue tension throughout arc of motion. Patella was prepared for 38-mm component and tracked centrally using no thumbs technique. Trials were removed and tibial preparation was completed. Bony surfaces were copiously irrigated by pulse lavage and dried before the real components were cemented into place using antibiotic impregnated cement. Excess cement was removed and knee was reduced in full extension with the real insert in place during cement setup. Periarticular soft tissues were injected with solution containing 0.5% ropivacaine with epinephrine as well as clonidine and Toradol. Tourniquet was released and hemostasis was obtained with the Bovie. Wound was irrigated. Arthrotomy was closed with a combination of heavy Vicryl sutures and a running #2 Stratafix suture. Skin and subcutaneous layers were closed in layered fashion with Vicryl and a running Monocryl subcuticular stitch.   The wound was dressed with Dermabond and Aquacel occlusive dressing as well as sterile compressive dressing. The patient's bladder was decompressed with straight catheterization before he was transported to the postanesthesia care unit in stable condition. All counts were correct at the end of the procedure. The physician assistant was critical throughout the case to assist with positioning, retraction, and closure. There were no other available residents, fellows, or surgical assistants available to assist during this procedure.         Pascale Nolasco MD      [id number]  D:  10/22/2020 17:48  T:  10/22/2020 21:59  JOB #:  [id number]  CC:  MD Bob Farah MD

## 2020-10-23 NOTE — PROGRESS NOTES
Problem: Knee Replacement: Post-Op Day 1  Goal: Activity/Safety  Outcome: Progressing Towards Goal  Note: Pt ambulated safely with physical therapy using a walker, gait belt, and one person assistance. Goal: Nutrition/Diet  Outcome: Progressing Towards Goal  Note: Pt tolerated a regular diet. Goal: Medications  Outcome: Progressing Towards Goal  Goal: Respiratory  Outcome: Progressing Towards Goal  Note: Pt demonstrated effective use of incentive spirometer.    Goal: Psychosocial  Outcome: Progressing Towards Goal  Goal: Discharge Planning  Outcome: Progressing Towards Goal  Goal: *Optimal pain control at patient's stated goal  Outcome: Progressing Towards Goal  Goal: *Hemodynamically stable  Outcome: Progressing Towards Goal  Goal: *Discharge plan identified  Outcome: Progressing Towards Goal     Problem: Discharge Planning  Goal: *Discharge to safe environment  Description: See CM note    DANIEL Reese/CRM      Outcome: Progressing Towards Goal

## 2020-10-23 NOTE — PROGRESS NOTES
Bedside and Verbal shift change report given to [place] (oncoming nurse) by Lennie Nash (offgoing nurse). Report included the following information SBAR, Kardex and MAR.

## 2020-10-23 NOTE — PROGRESS NOTES
LESLEY: Home with home health. [facility] has accepted patient. Patient owns walker and cane. Wife will transport patient home via car. Patient's physical address is [address] [place] [address]      Care Management Interventions  PCP Verified by CM: Yes  Mode of Transport at Discharge: Other (see comment)(family/car)  Transition of Care Consult (CM Consult): Home Health, Discharge [place] [address] 74 [address] [address]: No  Reason Outside [place]: Physician referred to specific agency  MyChart Signup: No  Discharge Durable Medical Equipment: No(patient owns RW)  Physical Therapy Consult: Yes  Occupational Therapy Consult: No  Speech Therapy Consult: No  Current Support Network: Lives with Spouse, Own Home  Confirm Follow Up Transport: Family  The Patient and/or Patient Representative was Provided with a Choice of Provider and Agrees with the Discharge Plan?: Yes  Freedom of Choice List was Provided with Basic Dialogue that Supports the Patient's Individualized Plan of Care/Goals, Treatment Preferences and Shares the Quality Data Associated with the Providers?: Yes  Discharge Location  Discharge Placement: Home with home health    Reason for Admission:   Left total knee arthroplasty.                      RUR Score:  N/A (obs)                   Plan for utilizing home health: The Plan for Transition of Care is related to the following treatment goals: home health    The Patient and/or patient representative  was provided with a choice of provider and agrees   with the discharge plan. [x] Yes [] No    Freedom of choice list was provided with basic dialogue that supports the patient's individualized plan of care/goals, treatment preferences and shares the quality data associated with the providers.  [x] Yes [] No         PCP: First and Last name:  Marciano Vaca MD   Name of Practice:    Are you a current patient: Yes/No: yes   Approximate date of last visit: 3 weeks ago   Can you participate in a virtual visit with your PCP:                     Current Advanced Directive/Advance Care Plan: AMD on file                          Transition of Care Plan:     Home with home health                   Observation notice provided in writing to patient and/or caregiver as well as verbal explanation of the policy. Patients who are in outpatient status also receive the Observation notice.         DANIEL Peterson/MAC

## 2020-10-23 NOTE — PROGRESS NOTES
Problem: Mobility Impaired (Adult and Pediatric)  Goal: *Acute Goals and Plan of Care (Insert Text)  Description: FUNCTIONAL STATUS PRIOR TO ADMISSION: Patient was independent and active without use of DME.    HOME SUPPORT PRIOR TO ADMISSION: The patient lived with wife but did not require assist.    Physical Therapy Goals  Initiated 10/22/2020    1. Patient will move from supine to sit and sit to supine  in bed with modified independence within 4 days. 2. Patient will perform sit to stand with modified independence within 4 days. 3. Patient will ambulate with modified independence for 250 feet with the least restrictive device within 4 days. 4. Patient will ascend/descend 4 stairs with 2 handrail(s) with modified independence within 4 days. 5. Patient will perform home exercise program per protocol with modified independence within 4 days. 6. Patient will demonstrate AROM 0-90 degrees in operative joint within 4 days. Outcome: Progressing Towards Goal   PHYSICAL THERAPY NOTE  Patient: Chavo cM (5 y.o. male)  Date: 10/23/2020  Primary Diagnosis: Localized osteoarthritis of left knee [M17.12]  Procedure(s) (LRB):  LEFT TOTAL KNEE ARTHROPLASTY (Left) 1 Day Post-Op   Precautions:  Fall, WBAT    Chavo Mc was seen for morning PT session. He is walking 180 feet with the RW and with SBA-Supervision. Gait steady overall w/ no LOB or buckling able to amb w/ reciprocal gait pattern. Reports pain in RLE (thigh) as 3-4/10 w/ mobility and reports placing equal weight through BLE's. Patient was instructed in stair management and able to negotiate 4 stairs using both rails with CGA to SBA. Reviewed activity recommendations and restrictions with patient. Chavo Mc is clear for discharge home from a mobility standpoint and RN is aware.       PT VS between 0965 and 0957  Vitals:    10/23/20 0925 10/23/20 0942 10/23/20 0943 10/23/20 0957   BP: 121/66 134/72 121/67 133/75   BP 1 Location:       BP Patient Position:  Sitting Standing Post activity; Sitting   Pulse: 73 95 (!) 104 (!) 101   Resp: 16      Temp: 98.1 °F (36.7 °C)      SpO2: 95%      Weight:             Morning session functional mobility:  Bed Mobility:     Supine to Sit: Supervision  Sit to Supine: Supervision  Scooting: Supervision  Transfers:  Sit to Stand: Stand-by assistance;Contact guard assistance  Stand to Sit: Stand-by assistance                       Balance:   Sitting: Intact  Standing: Intact; With support  Standing - Static: Constant support;Good  Standing - Dynamic : Constant support;Good  Ambulation/Gait Training:     Assistive Device: Gait belt;Walker, rolling  Ambulation - Level of Assistance: Stand-by assistance;Contact guard assistance;Assist x1        Gait Abnormalities: Antalgic;Decreased step clearance  Right Side Weight Bearing: Full  Left Side Weight Bearing: As tolerated  Base of Support: Widened  Stance: Left decreased  Speed/Janie: Pace decreased (<100 feet/min)  Step Length: Left shortened;Right shortened                 Stairs:  Number of Stairs Trained: 4  Stairs - Level of Assistance: Stand-by assistance;Contact guard assistance;Assist X1  Rail Use: Both    Thank you,  Amaya Arzola,PTA   Time Calculation: 30 mins

## 2020-10-23 NOTE — PROGRESS NOTES
The Joint Replacement Discharge Education video was reviewed by the patient/family. The content was discussed utilizing teach back, questions were answered. The patient verbalized an understanding of the instructions. I have reviewed discharge instructions with the patient. The patient verbalized understanding.

## 2020-10-23 NOTE — PROGRESS NOTES
Orthopaedics Daily Progress Note                            Date of Surgery:  10/22/2020      Patient: Shonda Ivy   YOB: 1945  Age: 75 y.o. SUBJECTIVE:   1 Day Post-Op following LEFT TOTAL KNEE ARTHROPLASTY. The patient's post operative pain is controlled. No CP/SOB. No N/V. The patient's mobility will be evaluated today during PT sessions. OBJECTIVE:     Vital Signs:      Visit Vitals  /65 (BP 1 Location: Right arm, BP Patient Position: At rest)   Pulse 95   Temp 98.2 °F (36.8 °C)   Resp 16   Wt 101.3 kg (223 lb 5.2 oz)   SpO2 97%   BMI 32.04 kg/m²       Physical Exam:  General: A&Ox3. The patient is cooperative, and in no acute distress. Respiratory: Respirations are unlabored. Surgical site(s): dressing clean, dry  Musculoskeletal: Calves are soft, supple, and non-tender upon palpation. Motor 5/5. Neurological:  Neurovascularly intact with good dorsi and plantar flexion. Pulses symmetrical.    Laboratory Values:             Recent Results (from the past 12 hour(s))   METABOLIC PANEL, BASIC    Collection Time: 10/23/20  4:22 AM   Result Value Ref Range    Sodium 140 136 - 145 mmol/L    Potassium 4.3 3.5 - 5.1 mmol/L    Chloride 110 (H) 97 - 108 mmol/L    CO2 23 21 - 32 mmol/L    Anion gap 7 5 - 15 mmol/L    Glucose 123 (H) 65 - 100 mg/dL    BUN 13 6 - 20 MG/DL    Creatinine 0.68 (L) 0.70 - 1.30 MG/DL    BUN/Creatinine ratio 19 12 - 20      GFR est AA >60 >60 ml/min/1.73m2    GFR est non-AA >60 >60 ml/min/1.73m2    Calcium 7.9 (L) 8.5 - 10.1 MG/DL   HEMOGLOBIN    Collection Time: 10/23/20  4:22 AM   Result Value Ref Range    HGB 9.4 (L) 12.1 - 17.0 g/dL         PLAN:     S/P LEFT TOTAL KNEE ARTHROPLASTY -Continue WBAT. -Mobilize and continue with PT/OT until discharged     Hemodynamics Hgb today is 9.4. Acute blood loss anemia as expected. Patient asymptomatic. Continue to monitor. Wound Monitor postop dressing; no postop dressing changes necessary. Reinforce PRN. Post Operative Pain Pain Control: stable, mild-to-moderate joint symptoms intermittently, reasonably well controlled by current meds. DVT Prophylaxis Continue with SCD'S, Ankle Pump Exercises. ASA 81mg BID     Discharge Disposition Discharge plan: Home with HHPT pending PT clearance.        Signed By: Bernardo Nevarez PA-C  October 23, 2020 6:37 AM

## 2020-10-23 NOTE — PROGRESS NOTES
Bedside shift change report given to Carlos Gibbs (oncoming nurse) by Ester Lozano (offgoing nurse). Report included the following information SBAR, Kardex, Intake/Output, MAR and Recent Results.

## 2020-10-23 NOTE — PROGRESS NOTES
Problem: Risk for Spread of Infection  Goal: Prevent transmission of infectious organism to others  Description: Prevent the transmission of infectious organisms to other patients, staff members, and visitors. Outcome: Resolved/Met     Problem: Patient Education:  Go to Education Activity  Goal: Patient/Family Education  Outcome: Resolved/Met     Problem: Patient Education: Go to Patient Education Activity  Goal: Patient/Family Education  Outcome: Resolved/Met     Problem: Patient Education: Go to Patient Education Activity  Goal: Patient/Family Education  Outcome: Resolved/Met     Problem: Knee Replacement: Day of Surgery/Unit  Goal: Off Pathway (Use only if patient is Off Pathway)  Outcome: Resolved/Met  Goal: Activity/Safety  Outcome: Resolved/Met  Goal: Consults, if ordered  Outcome: Resolved/Met  Goal: Diagnostic Test/Procedures  Outcome: Resolved/Met  Goal: Nutrition/Diet  Outcome: Resolved/Met  Goal: Medications  Outcome: Resolved/Met  Goal: Respiratory  Outcome: Resolved/Met  Goal: Treatments/Interventions/Procedures  Outcome: Resolved/Met  Goal: Psychosocial  Outcome: Resolved/Met  Goal: *Initiate mobility  Outcome: Resolved/Met  Goal: *Optimal pain control at patient's stated goal  Outcome: Resolved/Met  Goal: *Hemodynamically stable  Outcome: Resolved/Met     Problem: Knee Replacement: Post-Op Day 1  Goal: Off Pathway (Use only if patient is Off Pathway)  Outcome: Resolved/Met  Goal: Activity/Safety  10/23/2020 1048 by Georgie Tomas  Outcome: Resolved/Met  10/23/2020 1047 by Georgie Tomas P  Outcome: Progressing Towards Goal  Note: Pt ambulated safely with physical therapy using a walker, gait belt, and one person assistance. Goal: Diagnostic Test/Procedures  Outcome: Resolved/Met  Goal: Nutrition/Diet  10/23/2020 1048 by Georgie Tomas  Outcome: Resolved/Met  10/23/2020 1047 by Georgie Tomas P  Outcome: Progressing Towards Goal  Note: Pt tolerated a regular diet. Goal: Medications  10/23/2020 1048 by Ashleigh Pederson  Outcome: Resolved/Met  10/23/2020 1047 by Ashleigh Pederson  Outcome: Progressing Towards Goal  Goal: Respiratory  10/23/2020 1048 by Ashleigh Pederson  Outcome: Resolved/Met  10/23/2020 1047 by Ashleigh Pederson P  Outcome: Progressing Towards Goal  Note: Pt demonstrated effective use of incentive spirometer.    Goal: Treatments/Interventions/Procedures  Outcome: Resolved/Met  Goal: Psychosocial  10/23/2020 1048 by Ashleigh Pederson  Outcome: Resolved/Met  10/23/2020 1047 by Ashleigh Pederson  Outcome: Progressing Towards Goal  Goal: Discharge Planning  10/23/2020 1048 by Ashleigh Pederson  Outcome: Resolved/Met  10/23/2020 1047 by Ashleigh Pederson  Outcome: Progressing Towards Goal  Goal: *Demonstrates progressive activity  Outcome: Resolved/Met  Goal: *Optimal pain control at patient's stated goal  10/23/2020 1048 by Ashleigh Pederson  Outcome: Resolved/Met  10/23/2020 1047 by Ashleigh Pederson  Outcome: Progressing Towards Goal  Goal: *Hemodynamically stable  10/23/2020 1048 by Ashleigh Pederson  Outcome: Resolved/Met  10/23/2020 1047 by Ashleigh Pederson  Outcome: Progressing Towards Goal  Goal: *Discharge plan identified  10/23/2020 1048 by Ashleigh Pederson  Outcome: Resolved/Met  10/23/2020 1047 by Ashleigh Pederson  Outcome: Progressing Towards Goal     Problem: Knee Replacement: Post-Op Day 2  Goal: Off Pathway (Use only if patient is Off Pathway)  Outcome: Resolved/Met  Goal: Activity/Safety  Outcome: Resolved/Met  Goal: Diagnostic Test/Procedures  Outcome: Resolved/Met  Goal: Medications  Outcome: Resolved/Met  Goal: Respiratory  Outcome: Resolved/Met  Goal: Treatments/Interventions/Procedures  Outcome: Resolved/Met  Goal: Psychosocial  Outcome: Resolved/Met  Goal: *Met physical therapy criteria for discharge to the next level of care  Outcome: Resolved/Met  Goal: *Optimal pain control with oral analgesia  Outcome: Resolved/Met  Goal: *Hemodynamically stable  Outcome: Resolved/Met  Goal: *Tolerating diet  Outcome: Resolved/Met  Goal: *Patient verbalizes understanding of discharge instructions  Outcome: Resolved/Met     Problem: Hypertension  Goal: *Blood pressure within specified parameters  Outcome: Resolved/Met  Goal: *Fluid volume balance  Outcome: Resolved/Met  Goal: *Labs within defined limits  Outcome: Resolved/Met     Problem: Patient Education: Go to Patient Education Activity  Goal: Patient/Family Education  Outcome: Resolved/Met     Problem: Falls - Risk of  Goal: *Absence of Falls  Description: Document Lulu Fall Risk and appropriate interventions in the flowsheet.   Outcome: Resolved/Met  Note: Fall Risk Interventions:  Mobility Interventions: Patient to call before getting OOB         Medication Interventions: Patient to call before getting OOB    Elimination Interventions: Call light in reach              Problem: Patient Education: Go to Patient Education Activity  Goal: Patient/Family Education  Outcome: Resolved/Met     Problem: Discharge Planning  Goal: *Discharge to safe environment  Description: See CM note    DANIEL Blount/CRM      10/23/2020 1048 by Ynes Person  Outcome: Resolved/Met  10/23/2020 1047 by Ynes MILES  Outcome: Progressing Towards Goal 0

## 2020-10-26 ENCOUNTER — PATIENT OUTREACH (OUTPATIENT)
Dept: CASE MANAGEMENT | Age: 75
End: 2020-10-26

## 2020-10-26 NOTE — NURSE NAVIGATOR
[address]  [facility] Handoff     FROM:                                TO: At [address]                                                      ([address] [place] [address] or Facility name)  [address]  [address] [place] [address]  Dept: [address]: [phone]                                      Room#:  564/01                                                       Nurse Navigator:  Michael Coronado RN         SITUATION      ASAScore: ASA 2 - Patient with mild systemic disease with no functional limitations    Admitted:  10/22/2020  Hospital Day: 2      Attending Provider:  No att. providers found     Consultations:  None    PCP:  Lin Smith MD   [phone]     Admitting Dx:  Localized osteoarthritis of left knee [M17.12]       Active Problems:    Localized osteoarthritis of left knee (10/22/2020)      4 Days Post-Op of   Procedure(s):  LEFT TOTAL KNEE ARTHROPLASTY   BY: Jakub Ahumada MD             ON: 10/22/2020                  Code Status: Prior             Advance Directive? Yes Not W Pt (Send w/patient)     Isolation:  There are currently no Active Isolations       MDRO: MRSA    BACKGROUND     Allergies:  No Known Allergies    Past Medical History:   Diagnosis Date    Arthritis     Cancer ([facility] [address] 10/2018    PROSTATE    Chronic pain     GERD (gastroesophageal reflux disease)     Hypertension     Kidney stones        Past Surgical History:   Procedure Laterality Date    HX KNEE ARTHROSCOPY Left     HX PROSTATE SURGERY      PROSTATE SEEDS IMPLANTED        Prior to Admission Medications   Prescriptions Last Dose Informant Patient Reported? Taking? amLODIPine (NORVASC) 5 mg tablet 10/22/2020 at 0310  Yes Yes   Sig: Take 5 mg by mouth daily.    ibuprofen (AdviL) 200 mg tablet 10/21/2020 at Unknown time  Yes No   Sig: Take 600 mg by mouth every twelve (12) hours as needed for Pain.   lansoprazole (PREVACID) 30 mg capsule 10/21/2020 at Unknown time  Yes Yes   Sig: Take  by mouth Daily (before breakfast). losartan (COZAAR) 50 mg tablet 10/22/2020 at 0310  Yes Yes   Sig: Take  by mouth daily. mupirocin (BACTROBAN) 2 % ointment   No No   Sig: by Both Nostrils route two (2) times a day for 7 days. mupirocin (BACTROBAN) 2 % ointment   Yes No   Sig: by Both Nostrils route two (2) times a day. Indications: methicillin-resistant Staphylococcus aureus bacteria in nose   sildenafil, antihypertensive, (REVATIO) 20 mg tablet Not Taking at Unknown time  Yes No   Sig: Take 20 mg by mouth three (3) times daily. triamcinolone acetonide (KENALOG) 0.1 % dental paste 10/21/2020 at Unknown time  Yes Yes      Facility-Administered Medications: None       Vaccinations: There is no immunization history on file for this patient. ASSESSMENT   Age: 75 y.o. Gender: male        Height:                      Weight:Weight: 101.3 kg (223 lb 5.2 oz)     No data found. Active Orders   There are no active orders of the following types: Diet. Orientation: Orientation Level: Oriented X4    Active Lines/Drains:  (Peg Tube / Powell / CL or S/L?):no    Urinary Status: Voiding      Last BM: Last Bowel Movement Date: 10/21/20     Skin Integrity: Incision (comment)(left knee)             Mobility: Slightly limited   Weight Bearing Status: WBAT (Weight Bearing as Tolerated)      Gait Training  Assistive Device: Gait belt, Walker, rolling  Ambulation - Level of Assistance: Stand-by assistance, Contact guard assistance, Assist x1  Distance (ft): 100 Feet (ft)  Stairs - Level of Assistance: Stand-by assistance, Contact guard assistance, Assist X1  Number of Stairs Trained: 4  Rail Use: Both     On Anticoagulation?  YES  Aspirin                                         Pain Medications given:  oxycodone                                   Lab Results   Component Value Date/Time    Glucose 123 (H) 10/23/2020 04:22 AM    Hemoglobin A1c 5.7 (H) 10/13/2020 09:33 AM    INR 1.0 10/13/2020 09:33 AM    HGB 9.4 (L) 10/23/2020 04:22 AM    HGB 14.7 10/13/2020 09:33 AM       Readmission Risks:  Score:         RECOMMENDATION     See After Visit Summary (AVS) for:  · Discharge instructions  · After [address]   · Medication Reconciliation          [facility] Orthopaedic Nurse Navigator  SHELL Meier, RN-BC       Office  [phone]  Cell      [phone]  Fax      [phone]  [email or web address]             . Alison

## 2020-10-26 NOTE — PROGRESS NOTES
This note will not be viewable in [address] Post Discharge Follow-up contact after Joint Replacement    Patient discharged on 10/23/20  By  Pratik Bull   following  left knee Arthroplasty. Spoke with patient today, who reports they are \" doing well. \"  Denies Fever, Shortness of Breath or Chest Pain. Home Health has visited. Patient also reports:  Aquacel dressing clean, dry, intact  Calf is non-tender, operative extremity has moderate swelling. Pain is well managed. Discussed use of ice & elevation. Patient is progressing with therapy and is exercising independently. Taking Aspirin for anticoagulation, oxycodone for pain. Patient is not experiencing symptoms of constipation & urinating without difficulty. Discussed side effects of anticoagulants & pain medications (bleeding/bruising, constipation, lightheaded/dizziness)  Follow up appointment is scheduled. Discussed calling surgeon Dr Osvaldo pham  for drainage, bleeding, swelling in operative extremity, fever or pain. Discussed calling PCP Dr Kervin Padilla with other medical issues.

## 2021-07-14 ENCOUNTER — TRANSCRIBE ORDER (OUTPATIENT)
Dept: SCHEDULING | Age: 76
End: 2021-07-14

## 2021-07-14 DIAGNOSIS — M84.361A STRESS FRACTURE OF RIGHT TIBIA: Primary | ICD-10-CM

## 2021-07-27 ENCOUNTER — HOSPITAL ENCOUNTER (OUTPATIENT)
Dept: MRI IMAGING | Age: 76
Discharge: HOME | End: 2021-07-27
Attending: ORTHOPAEDIC SURGERY
Payer: MEDICARE

## 2021-07-27 DIAGNOSIS — M84.361A STRESS FRACTURE OF RIGHT TIBIA: ICD-10-CM

## 2021-07-27 PROCEDURE — 73721 MRI JNT OF LWR EXTRE W/O DYE: CPT

## 2022-12-07 ENCOUNTER — HOSPITAL ENCOUNTER (OUTPATIENT)
Dept: GENERAL RADIOLOGY | Age: 77
Discharge: HOME | End: 2022-12-07
Payer: MEDICARE

## 2022-12-07 ENCOUNTER — TRANSCRIBE ORDER (OUTPATIENT)
Dept: REGISTRATION | Age: 77
End: 2022-12-07

## 2022-12-07 DIAGNOSIS — M25.511 RIGHT SHOULDER PAIN: ICD-10-CM

## 2022-12-07 DIAGNOSIS — M25.511 RIGHT SHOULDER PAIN: Primary | ICD-10-CM

## 2022-12-07 PROCEDURE — 73030 X-RAY EXAM OF SHOULDER: CPT

## (undated) DEVICE — 4-PORT MANIFOLD: Brand: NEPTUNE 2

## (undated) DEVICE — BASIN EMSIS 16OZ GRAPHITE PLAS KID SHP MOLD GRAD FOR ORAL

## (undated) DEVICE — SOLUTION IV 50ML 0.9% SOD CHL

## (undated) DEVICE — STERILE POLYISOPRENE POWDER-FREE SURGICAL GLOVES WITH EMOLLIENT COATING: Brand: PROTEXIS

## (undated) DEVICE — SPONGE GZ W4XL4IN COT RADPQ HIGHLY ABSRB

## (undated) DEVICE — BLOCK BITE ENDOSCP AD 21 MM W/ DIL BLU LF DISP

## (undated) DEVICE — CATH IV AUTOGRD BC PNK 20GA 25 -- INSYTE

## (undated) DEVICE — BLADE SAW L81.5XW12.5MM THICKNESS 1.19MM SAG FOR SYS 4 2000

## (undated) DEVICE — BANDAGE COMPR M W6INXL10YD WHT BGE VELC E MTRX HK AND LOOP

## (undated) DEVICE — NEEDLE HYPO 21GA L1.5IN INTRAMUSCULAR S STL LATCH BVL UP

## (undated) DEVICE — Z DUP USE 2275493 DRESSING ALGINATE POST OPERATIVE 10X3.5 IN RECT PRIMASEAL

## (undated) DEVICE — SET ADMIN 16ML TBNG L100IN 2 Y INJ SITE IV PIGGY BK DISP

## (undated) DEVICE — SCRUB DRY SURG EZ SCRUB BRUSH PREOPERATIVE GRN

## (undated) DEVICE — 1200 GUARD II KIT W/5MM TUBE W/O VAC TUBE: Brand: GUARDIAN

## (undated) DEVICE — PREP SKN CHLRAPRP APL 26ML STR --

## (undated) DEVICE — DERMABOND SKIN ADH 0.7ML -- DERMABOND ADVANCED 12/BX

## (undated) DEVICE — SUTURE MCRYL SZ 3-0 L27IN ABSRB UD L24MM PS-1 3/8 CIR PRIM Y936H

## (undated) DEVICE — MARKER,SKIN,WI/RULER AND LABELS: Brand: MEDLINE

## (undated) DEVICE — T5 HOOD WITH PEEL AWAY FACE SHIELD

## (undated) DEVICE — FORCEPS BX L160CM DIA8MM GRSP DISECT CUP TIP NONLOCKING ROT

## (undated) DEVICE — Device

## (undated) DEVICE — Z DISCONTINUED PER MEDLINE LINE GAS SAMPLING O2/CO2 LNG AD 13 FT NSL W/ TBNG FILTERLINE

## (undated) DEVICE — STERILE POLYISOPRENE POWDER-FREE SURGICAL GLOVES: Brand: PROTEXIS

## (undated) DEVICE — TOTAL TRAY, 16FR 10ML SIL FOLEY, URN: Brand: MEDLINE

## (undated) DEVICE — ZIMMER® STERILE DISPOSABLE TOURNIQUET CUFF WITH PLC, DUAL PORT, SINGLE BLADDER, 34 IN. (86 CM)

## (undated) DEVICE — Device: Brand: SINGLE USE ASPIRATION NEEDLE

## (undated) DEVICE — SYR 20ML LL STRL LF --

## (undated) DEVICE — DRAPE,EXTREMITY,89X128,STERILE: Brand: MEDLINE

## (undated) DEVICE — SOLUTION SURG PREP 26 CC PURPREP

## (undated) DEVICE — REM POLYHESIVE ADULT PATIENT RETURN ELECTRODE: Brand: VALLEYLAB

## (undated) DEVICE — TIP SUCT CRV REG REDI

## (undated) DEVICE — CUSTOM CAST PD STR

## (undated) DEVICE — TOWEL 4 PLY TISS 19X30 SUE WHT

## (undated) DEVICE — SOLIDIFIER MEDC 1200ML -- CONVERT TO 356117

## (undated) DEVICE — SUTURE VCRL 1 L27IN ABSRB CT BRAID COAT UD J281H

## (undated) DEVICE — SUTURE VCRL SZ 0 L27IN ABSRB UD L36MM CT-1 1/2 CIR J260H

## (undated) DEVICE — YANKAUER,TAPERED BULBOUS TIP,W/O VENT: Brand: MEDLINE

## (undated) DEVICE — INFECTION CONTROL KIT SYS

## (undated) DEVICE — STRAP,POSITIONING,KNEE/BODY,FOAM,4X60": Brand: MEDLINE

## (undated) DEVICE — Device: Brand: BALLOON3

## (undated) DEVICE — YANKAUER,FLEXIBLE HANDLE,REGLR CAPACITY: Brand: MEDLINE INDUSTRIES, INC.

## (undated) DEVICE — HANDPIECE SET WITH BONE CLEANING TIP AND SUCTION TUBE: Brand: INTERPULSE

## (undated) DEVICE — NEEDLE HYPO 18GA L1.5IN PNK S STL HUB POLYPR SHLD REG BVL

## (undated) DEVICE — SYR 3ML LL TIP 1/10ML GRAD --

## (undated) DEVICE — SOLUTION IRRIG 1000ML H2O STRL BLT

## (undated) DEVICE — KENDALL RADIOLUCENT FOAM MONITORING ELECTRODE RECTANGULAR SHAPE: Brand: KENDALL

## (undated) DEVICE — NEONATAL-ADULT SPO2 SENSOR: Brand: NELLCOR

## (undated) DEVICE — SPONGE GZ W4XL4IN COT 12 PLY TYP VII WVN C FLD DSGN

## (undated) DEVICE — BAG SPEC BIOHZRD 10 X 10 IN --

## (undated) DEVICE — SOLUTION IRRIG 3000ML 0.9% SOD CHL FLX CONT 0797208] ICU MEDICAL INC]

## (undated) DEVICE — PADDING CAST SPEC 6INX4YD COT --

## (undated) DEVICE — SYR 10ML LUER LOK 1/5ML GRAD --

## (undated) DEVICE — CONTAINER,SPECIMEN,4OZ,OR STRL: Brand: MEDLINE

## (undated) DEVICE — CARTRIDGE BNE CEM MIX UNIV TWR VAC ROTOR BRK OFF NOZ W/O

## (undated) DEVICE — GOWN,PREVENTION PLUS,XLN/2XL,ST,22/CS: Brand: MEDLINE

## (undated) DEVICE — SYSTEM BX 25GA FN NDL SIX DST CUT EDG SHARKCORE

## (undated) DEVICE — BLADE SAW 1.27X90 MM FOR LG BNE [KMS2513PM62STE] [KOMET MEDICAL]

## (undated) DEVICE — CONTAINER SPEC 20 ML LID NEUT BUFF FORMALIN 10 % POLYPR STS

## (undated) DEVICE — SUTURE VCRL SZ 2-0 L36IN ABSRB UD L40MM CT 1/2 CIR J957H

## (undated) DEVICE — SUTURE STRATAFIX SPRL SZ 1 L14IN ABSRB VLT L48CM CTX 1/2 SXPD2B405

## (undated) DEVICE — 3M™ IOBAN™ 2 ANTIMICROBIAL INCISE DRAPE 6651EZ: Brand: IOBAN™ 2

## (undated) DEVICE — Z DISCONTINUED USE 2744636  DRESSING AQUACEL 14 IN ALG W3.5XL14IN POLYUR FLM CVR W/ HYDRCOLL